# Patient Record
Sex: MALE | Race: WHITE | Employment: FULL TIME | URBAN - NONMETROPOLITAN AREA
[De-identification: names, ages, dates, MRNs, and addresses within clinical notes are randomized per-mention and may not be internally consistent; named-entity substitution may affect disease eponyms.]

---

## 2022-03-28 ENCOUNTER — HOSPITAL ENCOUNTER (OUTPATIENT)
Dept: LAB | Age: 22
Discharge: HOME | End: 2022-03-28
Payer: COMMERCIAL

## 2022-03-28 ENCOUNTER — OFFICE VISIT (OUTPATIENT)
Dept: OTOLARYNGOLOGY | Age: 22
End: 2022-03-28
Payer: COMMERCIAL

## 2022-03-28 VITALS
RESPIRATION RATE: 16 BRPM | BODY MASS INDEX: 36.59 KG/M2 | DIASTOLIC BLOOD PRESSURE: 76 MMHG | HEIGHT: 70 IN | HEART RATE: 82 BPM | OXYGEN SATURATION: 97 % | WEIGHT: 255.6 LBS | SYSTOLIC BLOOD PRESSURE: 118 MMHG

## 2022-03-28 DIAGNOSIS — R09.81 NASAL CONGESTION: ICD-10-CM

## 2022-03-28 DIAGNOSIS — J30.89 NON-SEASONAL ALLERGIC RHINITIS, UNSPECIFIED TRIGGER: ICD-10-CM

## 2022-03-28 DIAGNOSIS — H91.93 BILATERAL HEARING LOSS, UNSPECIFIED HEARING LOSS TYPE: ICD-10-CM

## 2022-03-28 DIAGNOSIS — J32.9 RECURRENT SINUSITIS: ICD-10-CM

## 2022-03-28 DIAGNOSIS — S02.2XXS CLOSED FRACTURE OF NASAL BONE, SEQUELA: Primary | ICD-10-CM

## 2022-03-28 PROCEDURE — 36415 COLL VENOUS BLD VENIPUNCTURE: CPT

## 2022-03-28 PROCEDURE — 82785 ASSAY OF IGE: CPT

## 2022-03-28 PROCEDURE — 99214 OFFICE O/P EST MOD 30 MIN: CPT | Performed by: OTOLARYNGOLOGY

## 2022-03-28 PROCEDURE — 31231 NASAL ENDOSCOPY DX: CPT | Performed by: OTOLARYNGOLOGY

## 2022-03-28 PROCEDURE — 99204 OFFICE O/P NEW MOD 45 MIN: CPT | Performed by: OTOLARYNGOLOGY

## 2022-03-28 PROCEDURE — 86003 ALLG SPEC IGE CRUDE XTRC EA: CPT

## 2022-03-28 RX ORDER — MOMETASONE FUROATE 50 UG/1
2 SPRAY, METERED NASAL DAILY
Qty: 1 EACH | Refills: 3 | Status: SHIPPED | OUTPATIENT
Start: 2022-03-28

## 2022-03-28 RX ORDER — AZELASTINE 1 MG/ML
2 SPRAY, METERED NASAL 2 TIMES DAILY
Qty: 30 ML | Refills: 1 | Status: SHIPPED | OUTPATIENT
Start: 2022-03-28 | End: 2022-08-22

## 2022-03-28 RX ORDER — LORATADINE 10 MG/1
10 TABLET ORAL DAILY
COMMUNITY

## 2022-03-28 RX ORDER — LEVOTHYROXINE SODIUM 0.1 MG/1
163 TABLET ORAL DAILY
COMMUNITY

## 2022-03-28 NOTE — PROGRESS NOTES
3/28/2022 8:19 AM EDT  Srinath Olson (:  2000) is a 24 y.o. male,New patient, here for evaluation of the following chief complaint(s):  Sinus Problem (trouble breathing through noses, Hx of broken nose) and Hearing Problem (hearing loss x 1.5 years)      ASSESSMENT/PLAN:  1. Closed fracture of nasal bone, sequela    2. Recurrent sinusitis    3. Non-seasonal allergic rhinitis, unspecified trigger    4. Bilateral hearing loss, unspecified hearing loss type    5. Nasal congestion      1. Closed fracture of nasal bone, sequela  -     CT SINUS WO CONTRAST; Future  2. Recurrent sinusitis  -     CT SINUS WO CONTRAST; Future  3. Non-seasonal allergic rhinitis, unspecified trigger  -     Allergen, Region 5 Respiratory Panel; Future  4. Bilateral hearing loss, unspecified hearing loss type  -     External Referral To Audiology  5. Nasal congestion    CT sinus to evaluate sinuses, nasal bones  Allergy test  Switch Flonase to Nasonex  Add Astelin  Audio  Follow-up to review    No follow-ups on file. SUBJECTIVE/OBJECTIVE:  HPI  17yo man with nasal obstruction for about 6 years. History of nasal bone fracture 2 years ago. Has been on Flonase and Claritin for about 10 years. Has diagnosis of allergic rhinitis. Also has hearing loss that he attributes to his sinus issues x 1.5 years. Works as  and it seems to be affecting his work. He states nasal obstruction is worse of the left side of his nose. He states that he is had multiple nasal traumas through sports and probably broken it more than once. He states that the nasal congestion is constant throughout the day and night but seems to be worse at nighttime. He is worked as an  for about 6 years and has a significant noise exposure. He gets about 1 sinus infection a year. No history of imaging or allergy testing.   No recent audiogram.    Past Medical History:   Diagnosis Date    Fracture of nasal bone     Headache     Hearing loss     Tinnitus      History reviewed. No pertinent surgical history. Social History    None       Family History   Problem Relation Age of Onset    No Known Problems Mother     Hypertension Father     No Known Problems Brother     No Known Problems Maternal Aunt     Hypertension Maternal Uncle     Thyroid Disease Paternal Aunt     Cancer Paternal Uncle     No Known Problems Maternal Grandmother     Cancer Maternal Grandfather     Thyroid Disease Paternal Grandmother     Cancer Paternal Grandfather     Diabetes Paternal Grandfather      Current Outpatient Medications   Medication Instructions    azelastine (ASTELIN) 0.1 % nasal spray 2 sprays, Nasal, 2 TIMES DAILY, Use in each nostril as directed    levothyroxine (SYNTHROID) 163 mcg, Oral, DAILY    loratadine (CLARITIN) 10 mg, Oral, DAILY    mometasone (NASONEX) 50 MCG/ACT nasal spray 2 sprays, Nasal, DAILY     No Known Allergies    ENT ROS: positive for - nasal congestion    General: The patient is found to be alert and normally responsive male. Hearing: grossly normal   Voice: Clear   Skin: The skin has normal colour and turgor. Face: The facial contour is symmetric at rest and with movement. Ears: The pinnae have normal contours. AD: EAC clear, TM intact, no effusion/erythema/retraction   AS: EAC clear, TM intact, no effusion/erythema/retraction   Eye: The ocular movements are full and symmetric, the conjunctiva is unremarkable; sclera are anicteric, pupillary response is symmetric. No nystagmus is found. Nose:   The external nasal contour is normal  The nares are patent without evidence of polyposis   Oral cavity:   The dentition is healthy. The oral mucosa is without lesions;  the tongue is symmetric with full mobility and is without fasciculation. The soft palate is symmetric. The oropharynx is unremarkable.   Neck: The neck has a normal contour; no masses are found on palpation    Fiberoptic examination of the upper airway using scope was conducted after first applying topical phenylephrine (1%) and lidocaine (4%) to the bilateral nasal cavity by spray. The endoscope was inserted and advanced through the bilateral side of the nose examining the mucosa and nasal structures. Right:  Inferior turbinate significantly enlarged, middle meatus clear, no polyps or purulence, excess thickened mucus  Left:  Inferior turbinate mildly enlarged and inflamed, middle meatus clear, no polyps or purulence  Nasopharynx clear, ET patent, adenoid small. Septum slightly to the right anteriorly. An electronic signature was used to authenticate this note.     --Sherly Mclean MD     3/28/2022 8:19 AM EDT

## 2022-03-30 LAB
2000687N OAK TREE IGE: <0.1 KU/L (ref 0–0.34)
ALLERGEN BERMUDA GRASS IGE: <0.1 KU/L (ref 0–0.34)
ALLERGEN BIRCH IGE: <0.1 KU/L (ref 0–0.34)
ALLERGEN DOG DANDER IGE: 0.11 KU/L (ref 0–0.34)
ALLERGEN GERMAN COCKROACH IGE: <0.1 KU/L (ref 0–0.34)
ALLERGEN HORMODENDRUM IGE: <0.1 KUL/L (ref 0–0.34)
ALLERGEN MOUSE EPITHELIA IGE: <0.1 KU/L (ref 0–0.34)
ALLERGEN PECAN TREE IGE: <0.1 KU/L (ref 0–0.34)
ALLERGEN PIGWEED ROUGH IGE: <0.1 KU/L (ref 0–0.34)
ALLERGEN SHEEP SORREL (W18) IGE: <0.1 KU/L (ref 0–0.34)
ALLERGEN TREE SYCAMORE: <0.1 KU/L (ref 0–0.34)
ALLERGEN WALNUT TREE IGE: <0.1 KU/L (ref 0–0.34)
ALLERGEN WHITE MULBERRY TREE, IGE: <0.1 KU/L (ref 0–0.34)
ALLERGEN, TREE, WHITE ASH IGE: <0.1 KU/L (ref 0–0.34)
ALTERNARIA ALTERNATA: <0.1 KU/L (ref 0–0.34)
ASPERGILLUS FUMIGATUS: <0.1 KU/L (ref 0–0.34)
CAT DANDER ANTIBODY: 0.62 KU/L (ref 0–0.34)
COTTONWOOD TREE: <0.1 KU/L (ref 0–0.34)
D. FARINAE: 10 KU/L (ref 0–0.34)
D. PTERONYSSINUS: 8.54 KU/L (ref 0–0.34)
ELM TREE: <0.1 KU/L (ref 0–0.34)
IGE: 33 IU/ML
MAPLE/BOXELDER TREE: <0.1 KU/L (ref 0–0.34)
MOUNTAIN CEDAR TREE: <0.1 KU/L (ref 0–0.34)
MUCOR RACEMOSUS: <0.1 KU/L (ref 0–0.34)
P. NOTATUM: <0.1 KU/L (ref 0–0.34)
RUSSIAN THISTLE: <0.1 KU/L (ref 0–0.34)
SHORT RAGWD(A ARTEMIS.) IGE: <0.1 KU/L (ref 0–0.34)
TIMOTHY GRASS: <0.1 KU/L (ref 0–0.34)

## 2022-04-04 ENCOUNTER — HOSPITAL ENCOUNTER (OUTPATIENT)
Dept: CT IMAGING | Age: 22
Discharge: HOME | End: 2022-04-06
Payer: COMMERCIAL

## 2022-04-04 DIAGNOSIS — S02.2XXS CLOSED FRACTURE OF NASAL BONE, SEQUELA: ICD-10-CM

## 2022-04-04 DIAGNOSIS — J32.9 RECURRENT SINUSITIS: ICD-10-CM

## 2022-04-04 PROCEDURE — 70486 CT MAXILLOFACIAL W/O DYE: CPT

## 2022-05-20 ENCOUNTER — OFFICE VISIT (OUTPATIENT)
Dept: OTOLARYNGOLOGY | Age: 22
End: 2022-05-20
Payer: COMMERCIAL

## 2022-05-20 VITALS
BODY MASS INDEX: 36.51 KG/M2 | SYSTOLIC BLOOD PRESSURE: 122 MMHG | DIASTOLIC BLOOD PRESSURE: 72 MMHG | HEIGHT: 70 IN | RESPIRATION RATE: 14 BRPM | OXYGEN SATURATION: 98 % | HEART RATE: 82 BPM | WEIGHT: 255 LBS

## 2022-05-20 DIAGNOSIS — J30.89 ALLERGIC RHINITIS DUE TO DUST MITE: ICD-10-CM

## 2022-05-20 DIAGNOSIS — R09.81 NASAL CONGESTION: ICD-10-CM

## 2022-05-20 DIAGNOSIS — J30.81 ALLERGIC RHINITIS DUE TO ANIMALS: Primary | ICD-10-CM

## 2022-05-20 PROCEDURE — 99213 OFFICE O/P EST LOW 20 MIN: CPT | Performed by: OTOLARYNGOLOGY

## 2022-05-20 RX ORDER — MONTELUKAST SODIUM 10 MG/1
10 TABLET ORAL EVERY EVENING
Qty: 30 TABLET | Refills: 3 | Status: SHIPPED | OUTPATIENT
Start: 2022-05-20 | End: 2022-10-11

## 2022-05-23 NOTE — PROGRESS NOTES
2022 8:19 AM EDT  Yesenia Vela (:  2000) is a 24 y.o. male,New patient, here for evaluation of the following chief complaint(s): Other (ck audio , allergy testing  ct)      ASSESSMENT/PLAN:  1. Allergic rhinitis due to animals    2. Allergic rhinitis due to dust mite    3. Nasal congestion      1. Allergic rhinitis due to animals  2. Allergic rhinitis due to dust mite  3. Nasal congestion    Will focus on allergies:  DC Nasonex  Continue Astelin  Singulair every evening  Follow-up in about 1 month  Pain with loud noise exposure is likely physiologic. Discussed limitations of the stapedius reflex. Hearing is normal.  Tinnitus likely related to occupational noise exposure as an . With ongoing exposures, tinnitus may progress. No follow-ups on file. SUBJECTIVE/OBJECTIVE:  HPI  19yo man with nasal obstruction for about 6 years. History of nasal bone fracture 2 years ago. Has been on Flonase and Claritin for about 10 years. Has diagnosis of allergic rhinitis. Also has hearing loss that he attributes to his sinus issues x 1.5 years. Works as  and it seems to be affecting his work. He states nasal obstruction is worse of the left side of his nose. He states that he is had multiple nasal traumas through sports and probably broken it more than once. He states that the nasal congestion is constant throughout the day and night but seems to be worse at nighttime. He is worked as an  for about 6 years and has a significant noise exposure. He gets about 1 sinus infection a year. No history of imaging or allergy testing. No recent audiogram.  He also reports constant bilateral tinnitus. He has otalgia particularly in the left ear with high noise levels greater than 100 DB. Today he follows up about 2 months later. he states that his mom has 4 cats. He notices worsening nasal congestion and sinus issues with norma environments.     RAST 3/28/22: +cat, dust mites     CT sinus 4/4/22: No evidence of sinusitis    Audiogram 4/12/22:    hearing within normal limits EU  Word discrimination score 100% AU  Tympanometry type A AU    Past Medical History:   Diagnosis Date    Fracture of nasal bone     Headache     Hearing loss     Tinnitus      No past surgical history on file. Social History    None       Family History   Problem Relation Age of Onset    No Known Problems Mother     Hypertension Father     No Known Problems Brother     No Known Problems Maternal Aunt     Hypertension Maternal Uncle     Thyroid Disease Paternal Aunt     Cancer Paternal Uncle     No Known Problems Maternal Grandmother     Cancer Maternal Grandfather     Thyroid Disease Paternal Grandmother     Cancer Paternal Grandfather     Diabetes Paternal Grandfather      Current Outpatient Medications   Medication Instructions    azelastine (ASTELIN) 0.1 % nasal spray 2 sprays, Nasal, 2 TIMES DAILY, Use in each nostril as directed    levothyroxine (SYNTHROID) 163 mcg, Oral, DAILY    loratadine (CLARITIN) 10 mg, Oral, DAILY    mometasone (NASONEX) 50 MCG/ACT nasal spray 2 sprays, Nasal, DAILY    montelukast (SINGULAIR) 10 mg, Oral, EVERY EVENING     No Known Allergies    ENT ROS: positive for - nasal congestion    General: The patient is found to be alert and normally responsive male. Hearing: grossly normal   Voice: Clear   Skin: The skin has normal colour and turgor. Face: The facial contour is symmetric at rest and with movement. Ears: The pinnae have normal contours. AD: EAC clear, TM intact, no effusion/erythema/retraction   AS: EAC clear, TM intact, no effusion/erythema/retraction   Eye: The ocular movements are full and symmetric, the conjunctiva is unremarkable; sclera are anicteric, pupillary response is symmetric. No nystagmus is found. Nose:   The external nasal contour is normal  The nares are patent without evidence of polyposis   Oral cavity:    The dentition is healthy. The oral mucosa is without lesions;  the tongue is symmetric with full mobility and is without fasciculation. The soft palate is symmetric. The oropharynx is unremarkable. Neck: The neck has a normal contour; no masses are found on palpation  Previous:    Fiberoptic examination of the upper airway using scope was conducted after first applying topical phenylephrine (1%) and lidocaine (4%) to the bilateral nasal cavity by spray. The endoscope was inserted and advanced through the bilateral side of the nose examining the mucosa and nasal structures. Right:  Inferior turbinate significantly enlarged, middle meatus clear, no polyps or purulence, excess thickened mucus  Left:  Inferior turbinate mildly enlarged and inflamed, middle meatus clear, no polyps or purulence  Nasopharynx clear, ET patent, adenoid small. Septum slightly to the right anteriorly. An electronic signature was used to authenticate this note.     --Charanjit Diaz MD     5/23/2022 8:19 AM EDT

## 2022-06-24 ENCOUNTER — OFFICE VISIT (OUTPATIENT)
Dept: OTOLARYNGOLOGY | Age: 22
End: 2022-06-24
Payer: COMMERCIAL

## 2022-06-24 VITALS
WEIGHT: 248.6 LBS | HEIGHT: 70 IN | DIASTOLIC BLOOD PRESSURE: 68 MMHG | RESPIRATION RATE: 16 BRPM | OXYGEN SATURATION: 97 % | SYSTOLIC BLOOD PRESSURE: 130 MMHG | HEART RATE: 79 BPM | BODY MASS INDEX: 35.59 KG/M2

## 2022-06-24 DIAGNOSIS — J30.81 ALLERGIC RHINITIS DUE TO ANIMALS: Primary | ICD-10-CM

## 2022-06-24 DIAGNOSIS — J30.89 ALLERGIC RHINITIS DUE TO DUST MITE: ICD-10-CM

## 2022-06-24 DIAGNOSIS — R09.81 NASAL CONGESTION: ICD-10-CM

## 2022-06-24 PROCEDURE — 99213 OFFICE O/P EST LOW 20 MIN: CPT | Performed by: OTOLARYNGOLOGY

## 2022-06-24 RX ORDER — METHYLPREDNISOLONE 4 MG/1
TABLET ORAL
Qty: 1 KIT | Refills: 0 | Status: SHIPPED | OUTPATIENT
Start: 2022-06-24 | End: 2022-06-30

## 2022-06-24 RX ORDER — FLUTICASONE FUROATE 27.5 UG/1
2 SPRAY, METERED NASAL DAILY
Qty: 1 EACH | Refills: 3 | Status: SHIPPED | OUTPATIENT
Start: 2022-06-24 | End: 2022-08-22

## 2022-06-24 ASSESSMENT — ENCOUNTER SYMPTOMS: SINUS COMPLAINT: 1

## 2022-06-24 NOTE — PROGRESS NOTES
2022 8:19 AM EDT  Sara Brasher (:  2000) is a 24 y.o. male,New patient, here for evaluation of the following chief complaint(s):  Sinus Problem (fu singular)      ASSESSMENT/PLAN:  1. Allergic rhinitis due to animals    2. Allergic rhinitis due to dust mite    3. Nasal congestion      1. Allergic rhinitis due to animals  2. Allergic rhinitis due to dust mite  3. Nasal congestion    Will focus on allergies:  Restart flonase sensimist   Continue Astelin  Singulair every evening  claritin every am   Medrol dose pack to start 1 hour after Synthroid  Follow-up in about 1 month  Discussed ITR and nasal valve repair with Latera   Pain with loud noise exposure is likely physiologic. Discussed limitations of the stapedius reflex. Hearing is normal.  Tinnitus likely related to occupational noise exposure as an . With ongoing exposures, tinnitus may progress. No follow-ups on file. SUBJECTIVE/OBJECTIVE:  Other    Sinus Problem      19yo man with nasal obstruction for about 6 years. History of nasal bone fracture 2 years ago. Has been on Flonase and Claritin for about 10 years. Has diagnosis of allergic rhinitis. Also has hearing loss that he attributes to his sinus issues x 1.5 years. Works as  and it seems to be affecting his work. He states nasal obstruction is worse of the left side of his nose. He states that he is had multiple nasal traumas through sports and probably broken it more than once. He states that the nasal congestion is constant throughout the day and night but seems to be worse at nighttime. He is worked as an  for about 6 years and has a significant noise exposure. He gets about 1 sinus infection a year. No history of imaging or allergy testing. No recent audiogram.  He also reports constant bilateral tinnitus. He has otalgia particularly in the left ear with high noise levels greater than 100 DB.      Followed up about 2 months later. he states that his mom has 4 cats. He notices worsening nasal congestion and sinus issues with norma environments. RAST 3/28/22: +cat, dust mites     CT sinus 4/4/22: No evidence of sinusitis    Audiogram 4/12/22:   hearing within normal limits EU  Word discrimination score 100% AU  Tympanometry type A AU    Here to review response. He is has stopped the Nasonex because of the numbness feeling it created in the nose. He has continued Astelin. He takes Claritin in the morning and Singulair in the evening. He has not noticed much improvement in his nasal congestion. Past Medical History:   Diagnosis Date    Fracture of nasal bone     Headache     Hearing loss     Tinnitus      History reviewed. No pertinent surgical history. Social History    None       Family History   Problem Relation Age of Onset    No Known Problems Mother     Hypertension Father     No Known Problems Brother     No Known Problems Maternal Aunt     Hypertension Maternal Uncle     Thyroid Disease Paternal Aunt     Cancer Paternal Uncle     No Known Problems Maternal Grandmother     Cancer Maternal Grandfather     Thyroid Disease Paternal Grandmother     Cancer Paternal Grandfather     Diabetes Paternal Grandfather      Current Outpatient Medications   Medication Instructions    azelastine (ASTELIN) 0.1 % nasal spray 2 sprays, Nasal, 2 TIMES DAILY, Use in each nostril as directed    fluticasone (FLONASE SENSIMIST) 27.5 MCG/SPRAY nasal spray 2 sprays, Each Nostril, DAILY    levothyroxine (SYNTHROID) 163 mcg, Oral, DAILY    loratadine (CLARITIN) 10 mg, Oral, DAILY    methylPREDNISolone (MEDROL DOSEPACK) 4 MG tablet Take by mouth.  mometasone (NASONEX) 50 MCG/ACT nasal spray 2 sprays, Nasal, DAILY    montelukast (SINGULAIR) 10 mg, Oral, EVERY EVENING     No Known Allergies    ENT ROS: positive for - nasal congestion    General: The patient is found to be alert and normally responsive male.     Hearing: grossly normal   Voice: Clear   Skin: The skin has normal colour and turgor. Face: The facial contour is symmetric at rest and with movement. Ears: The pinnae have normal contours. AD: EAC clear, TM intact, no effusion/erythema/retraction   AS: EAC clear, TM intact, no effusion/erythema/retraction   Eye: The ocular movements are full and symmetric, the conjunctiva is unremarkable; sclera are anicteric, pupillary response is symmetric. No nystagmus is found. Nose:   The external nasal contour is normal  Nasal mucosa inflamed  The nares are patent without evidence of polyposis   Inferior turbinate hypertrophy, more on the left than the right  Oral cavity:   The dentition is healthy. The oral mucosa is without lesions;  the tongue is symmetric with full mobility and is without fasciculation. The soft palate is symmetric. The oropharynx is unremarkable. Neck: The neck has a normal contour; no masses are found on palpation    Previous:  Fiberoptic examination of the upper airway using scope was conducted after first applying topical phenylephrine (1%) and lidocaine (4%) to the bilateral nasal cavity by spray. The endoscope was inserted and advanced through the bilateral side of the nose examining the mucosa and nasal structures. Right:  Inferior turbinate significantly enlarged, middle meatus clear, no polyps or purulence, excess thickened mucus  Left:  Inferior turbinate mildly enlarged and inflamed, middle meatus clear, no polyps or purulence  Nasopharynx clear, ET patent, adenoid small. Septum slightly to the right anteriorly. An electronic signature was used to authenticate this note.     --Valery Boyd MD     6/24/2022 8:19 AM EDT

## 2022-08-22 ENCOUNTER — OFFICE VISIT (OUTPATIENT)
Dept: OTOLARYNGOLOGY | Age: 22
End: 2022-08-22
Payer: COMMERCIAL

## 2022-08-22 VITALS
SYSTOLIC BLOOD PRESSURE: 134 MMHG | OXYGEN SATURATION: 96 % | BODY MASS INDEX: 36.25 KG/M2 | WEIGHT: 253.2 LBS | DIASTOLIC BLOOD PRESSURE: 78 MMHG | HEART RATE: 86 BPM | RESPIRATION RATE: 14 BRPM | HEIGHT: 70 IN

## 2022-08-22 DIAGNOSIS — J30.89 ALLERGIC RHINITIS DUE TO DUST MITE: ICD-10-CM

## 2022-08-22 DIAGNOSIS — J30.81 ALLERGIC RHINITIS DUE TO ANIMALS: Primary | ICD-10-CM

## 2022-08-22 DIAGNOSIS — J34.89 NASAL VALVE COLLAPSE: ICD-10-CM

## 2022-08-22 DIAGNOSIS — R09.81 NASAL CONGESTION: ICD-10-CM

## 2022-08-22 DIAGNOSIS — J34.3 HYPERTROPHY OF INFERIOR NASAL TURBINATE: ICD-10-CM

## 2022-08-22 PROCEDURE — 99213 OFFICE O/P EST LOW 20 MIN: CPT | Performed by: OTOLARYNGOLOGY

## 2022-08-22 RX ORDER — LEVOTHYROXINE SODIUM 175 UG/1
TABLET ORAL
COMMUNITY
Start: 2022-07-06

## 2022-08-22 RX ORDER — FLUTICASONE FUROATE 27.5 UG/1
2 SPRAY, METERED NASAL DAILY
Qty: 1 EACH | Refills: 5 | Status: SHIPPED | OUTPATIENT
Start: 2022-08-22

## 2022-08-22 RX ORDER — AZELASTINE 1 MG/ML
2 SPRAY, METERED NASAL 2 TIMES DAILY
Qty: 30 ML | Refills: 5 | Status: SHIPPED | OUTPATIENT
Start: 2022-08-22

## 2022-08-22 ASSESSMENT — ENCOUNTER SYMPTOMS: SINUS COMPLAINT: 1

## 2022-08-22 NOTE — PATIENT INSTRUCTIONS
Latera implant for nasal valve collapse  Inferior turbinate reduction for inferior turbinate hypertrophy

## 2022-08-22 NOTE — PROGRESS NOTES
2022 8:19 AM EDT  Ellie Alamo (:  2000) is a 24 y.o. male,New patient, here for evaluation of the following chief complaint(s):  Sinus Problem (6 wk fu medrol and flonase/)      ASSESSMENT/PLAN:  1. Allergic rhinitis due to animals    2. Allergic rhinitis due to dust mite    3. Nasal congestion    4. Hypertrophy of inferior nasal turbinate    5. Nasal valve collapse      1. Allergic rhinitis due to animals  2. Allergic rhinitis due to dust mite  3. Nasal congestion  4. Hypertrophy of inferior nasal turbinate  5. Nasal valve collapse    Will focus on allergies:  Continue flonase sensimist   Continue Astelin  Continue Singulair every evening  Continue claritin every am  Start NeilMed sinus rinse once a day    Discussed ITR and nasal valve repair with Latera due to maximal medical management and refractory symptoms  Follow-up in 1 month to rediscuss surgery options    No follow-ups on file. SUBJECTIVE/OBJECTIVE:  Sinus Problem    Other    22yo man with nasal obstruction for about 6 years. History of nasal bone fracture 2 years ago. Has been on Flonase and Claritin for about 10 years. Has diagnosis of allergic rhinitis. Also has hearing loss that he attributes to his sinus issues x 1.5 years. Works as  and it seems to be affecting his work. He states nasal obstruction is worse of the left side of his nose. He states that he is had multiple nasal traumas through sports and probably broken it more than once. He states that the nasal congestion is constant throughout the day and night but seems to be worse at nighttime. He is worked as an  for about 6 years and has a significant noise exposure. He gets about 1 sinus infection a year. No history of imaging or allergy testing. No recent audiogram.  He also reports constant bilateral tinnitus. He has otalgia particularly in the left ear with high noise levels greater than 100 DB.      Followed up about 2 months later. he states that his mom has 4 cats. He notices worsening nasal congestion and sinus issues with norma environments. RAST 3/28/22: +cat, dust mites     CT sinus 4/4/22: No evidence of sinusitis    Audiogram 4/12/22:   hearing within normal limits EU  Word discrimination score 100% AU  Tympanometry type A AU    Followed up to review response he is has stopped the Nasonex because of the numbness feeling it created in the nose. He has continued Astelin. He takes Claritin in the morning and Singulair in the evening. He has not noticed much improvement in his nasal congestion. Today he again follows up about 6 weeks later to review his nasal congestion. He notices significant improvement after the Medrol Dosepak. He felt better for about 2 weeks but the symptoms have returned to his baseline. He continues to endorse nasal congestion throughout the day and at nighttime. He has been using routinely Flonase, Astelin, Singulair, Claritin. Past Medical History:   Diagnosis Date    Fracture of nasal bone     Headache     Hearing loss     Tinnitus      History reviewed. No pertinent surgical history.   Social History    None       Family History   Problem Relation Age of Onset    No Known Problems Mother     Hypertension Father     No Known Problems Brother     No Known Problems Maternal Aunt     Hypertension Maternal Uncle     Thyroid Disease Paternal Aunt     Cancer Paternal Uncle     No Known Problems Maternal Grandmother     Cancer Maternal Grandfather     Thyroid Disease Paternal Grandmother     Cancer Paternal Grandfather     Diabetes Paternal Grandfather      Current Outpatient Medications   Medication Instructions    azelastine (ASTELIN) 0.1 % nasal spray 2 sprays, Nasal, 2 TIMES DAILY, Use in each nostril as directed    fluticasone (FLONASE SENSIMIST) 27.5 MCG/SPRAY nasal spray 2 sprays, Each Nostril, DAILY    levothyroxine (SYNTHROID) 175 MCG tablet take 1 tablet by mouth once daily levothyroxine (SYNTHROID) 163 mcg, DAILY    loratadine (CLARITIN) 10 mg, Oral, DAILY    mometasone (NASONEX) 50 MCG/ACT nasal spray 2 sprays, Nasal, DAILY    montelukast (SINGULAIR) 10 mg, Oral, EVERY EVENING     No Known Allergies    ENT ROS: positive for - nasal congestion    General: The patient is found to be alert and normally responsive male. Hearing: grossly normal   Voice: Clear   Skin: The skin has normal colour and turgor. Face: The facial contour is symmetric at rest and with movement. Ears: The pinnae have normal contours. AD: EAC clear, TM intact, no effusion/erythema/retraction   AS: EAC clear, TM intact, no effusion/erythema/retraction   Eye: The ocular movements are full and symmetric, the conjunctiva is unremarkable; sclera are anicteric, pupillary response is symmetric. No nystagmus is found. Nose:   The external nasal contour is normal  Nasal mucosa inflamed  The nares are patent without evidence of polyposis   Inferior turbinate hypertrophy, more on the right than the left  + Modified Sana maneuver bilaterally  Oral cavity:   The dentition is healthy. The oral mucosa is without lesions;  the tongue is symmetric with full mobility and is without fasciculation. The soft palate is symmetric. The oropharynx is unremarkable. Neck: The neck has a normal contour; no masses are found on palpation    Previous:  Fiberoptic examination of the upper airway using scope was conducted after first applying topical phenylephrine (1%) and lidocaine (4%) to the bilateral nasal cavity by spray. The endoscope was inserted and advanced through the bilateral side of the nose examining the mucosa and nasal structures. Right:  Inferior turbinate significantly enlarged, middle meatus clear, no polyps or purulence, excess thickened mucus  Left:  Inferior turbinate mildly enlarged and inflamed, middle meatus clear, no polyps or purulence  Nasopharynx clear, ET patent, adenoid small.  Septum slightly to the right anteriorly. An electronic signature was used to authenticate this note.     --George Wall MD     8/22/2022 8:19 AM EDT

## 2022-09-16 ENCOUNTER — OFFICE VISIT (OUTPATIENT)
Dept: OTOLARYNGOLOGY | Age: 22
End: 2022-09-16
Payer: COMMERCIAL

## 2022-09-16 VITALS
HEIGHT: 70 IN | HEART RATE: 77 BPM | DIASTOLIC BLOOD PRESSURE: 82 MMHG | OXYGEN SATURATION: 98 % | RESPIRATION RATE: 14 BRPM | SYSTOLIC BLOOD PRESSURE: 138 MMHG | WEIGHT: 251.4 LBS | TEMPERATURE: 96.6 F | BODY MASS INDEX: 35.99 KG/M2

## 2022-09-16 DIAGNOSIS — Z01.818 PRE-OP TESTING: ICD-10-CM

## 2022-09-16 DIAGNOSIS — R09.81 NASAL CONGESTION: Primary | ICD-10-CM

## 2022-09-16 DIAGNOSIS — J34.89 NASAL VALVE COLLAPSE: ICD-10-CM

## 2022-09-16 DIAGNOSIS — J34.3 HYPERTROPHY OF INFERIOR NASAL TURBINATE: ICD-10-CM

## 2022-09-16 PROCEDURE — 99213 OFFICE O/P EST LOW 20 MIN: CPT | Performed by: OTOLARYNGOLOGY

## 2022-09-16 ASSESSMENT — ENCOUNTER SYMPTOMS: SINUS COMPLAINT: 1

## 2022-09-16 NOTE — PROGRESS NOTES
2022 8:19 AM EDT  Natalie Buckner (:  2000) is a 24 y.o. male,New patient, here for evaluation of the following chief complaint(s):  Sinus Problem (1 mo fu astelin, cordell med, flonase)      ASSESSMENT/PLAN:  1. Nasal congestion    2. Hypertrophy of inferior nasal turbinate    3. Nasal valve collapse    4. Pre-op testing      1. Nasal congestion  2. Hypertrophy of inferior nasal turbinate  3. Nasal valve collapse  4. Pre-op testing  -     CBC with Auto Differential; Future  -     Basic Metabolic Panel; Future    Will focus on allergies:  Continue flonase sensimist   Continue Astelin  Continue Singulair every evening  Continue claritin every am  Start NeilMed sinus rinse once a day    Discussed ITR and nasal valve repair with Latera due to maximal medical management and refractory symptoms    Add betamethasone     Discussed that the goal of surgery is to make chronic sinus and nasal disease less symptomatic and make chronic issues more manageable with medications. Discussed risks, benefits, indications, and alternatives to latera implant/turbinate reduction including but not limited to epistaxis/bleeding, pain, infection, crusting, nasal drainage, thick mucus, anosmia, prolonged healing, damage to surrounding structures need for revision procedures, extrusion, deformity, synechiae, and need for continued medical management to maintain results as well as continued nasal obstruction. No follow-ups on file. SUBJECTIVE/OBJECTIVE:  Sinus Problem    Other    22yo man with nasal obstruction for about 6 years. History of nasal bone fracture 2 years ago. Has been on Flonase and Claritin for about 10 years. Has diagnosis of allergic rhinitis. Also has hearing loss that he attributes to his sinus issues x 1.5 years. Works as  and it seems to be affecting his work. He states nasal obstruction is worse of the left side of his nose.   He states that he is had multiple nasal traumas through sports and probably broken it more than once. He states that the nasal congestion is constant throughout the day and night but seems to be worse at nighttime. He is worked as an  for about 6 years and has a significant noise exposure. He gets about 1 sinus infection a year. No history of imaging or allergy testing. No recent audiogram.  He also reports constant bilateral tinnitus. He has otalgia particularly in the left ear with high noise levels greater than 100 DB. Followed up about 2 months later. he states that his mom has 4 cats. He notices worsening nasal congestion and sinus issues with norma environments. RAST 3/28/22: +cat, dust mites     CT sinus 4/4/22: No evidence of sinusitis    Audiogram 4/12/22:   hearing within normal limits EU  Word discrimination score 100% AU  Tympanometry type A AU    Followed up to review response he is has stopped the Nasonex because of the numbness feeling it created in the nose. He has continued Astelin. He takes Claritin in the morning and Singulair in the evening. He has not noticed much improvement in his nasal congestion. He followed up about 6 weeks later to review his nasal congestion. He notices significant improvement after the Medrol Dosepak. He felt better for about 2 weeks but the symptoms have returned to his baseline. He continues to endorse nasal congestion throughout the day and at nighttime. He has been using routinely Flonase, Astelin, Singulair, Claritin. He follows up again about 4 weeks later. Noticing some improvement but minimal.  States that for the first 2 weeks he did notice some improvement but now it has decreased and returned to his baseline. 2 weeks ago he did suffer an upper respiratory infection. Past Medical History:   Diagnosis Date    Fracture of nasal bone     Headache     Hearing loss     Tinnitus      History reviewed. No pertinent surgical history.   Social History    None       Family History Problem Relation Age of Onset    No Known Problems Mother     Hypertension Father     No Known Problems Brother     No Known Problems Maternal Aunt     Hypertension Maternal Uncle     Thyroid Disease Paternal Aunt     Cancer Paternal Uncle     No Known Problems Maternal Grandmother     Cancer Maternal Grandfather     Thyroid Disease Paternal Grandmother     Cancer Paternal Grandfather     Diabetes Paternal Grandfather      Current Outpatient Medications   Medication Instructions    azelastine (ASTELIN) 0.1 % nasal spray 2 sprays, Nasal, 2 TIMES DAILY, Use in each nostril as directed    fluticasone (FLONASE SENSIMIST) 27.5 MCG/SPRAY nasal spray 2 sprays, Each Nostril, DAILY    levothyroxine (SYNTHROID) 175 MCG tablet take 1 tablet by mouth once daily    levothyroxine (SYNTHROID) 163 mcg, DAILY    loratadine (CLARITIN) 10 mg, Oral, DAILY    mometasone (NASONEX) 50 MCG/ACT nasal spray 2 sprays, Nasal, DAILY    montelukast (SINGULAIR) 10 mg, Oral, EVERY EVENING     No Known Allergies    ENT ROS: positive for - nasal congestion    General: The patient is found to be alert and normally responsive male. Hearing: grossly normal   Voice: Clear   Skin: The skin has normal colour and turgor. Face: The facial contour is symmetric at rest and with movement. Ears: The pinnae have normal contours. AD: EAC clear, TM intact, no effusion/erythema/retraction   AS: EAC clear, TM intact, no effusion/erythema/retraction   Eye: The ocular movements are full and symmetric, the conjunctiva is unremarkable; sclera are anicteric, pupillary response is symmetric. No nystagmus is found. Nose:   The external nasal contour is normal  Nasal mucosa inflamed  The nares are patent without evidence of polyposis   Inferior turbinate hypertrophy, more on the left than the right  + Modified Sana maneuver bilaterally  Oral cavity:   The dentition is healthy.   The oral mucosa is without lesions;  the tongue is symmetric with full mobility and is without fasciculation. The soft palate is symmetric. The oropharynx is unremarkable. Neck: The neck has a normal contour; no masses are found on palpation    Previous:  Fiberoptic examination of the upper airway using scope was conducted after first applying topical phenylephrine (1%) and lidocaine (4%) to the bilateral nasal cavity by spray. The endoscope was inserted and advanced through the bilateral side of the nose examining the mucosa and nasal structures. Right:  Inferior turbinate significantly enlarged, middle meatus clear, no polyps or purulence, excess thickened mucus  Left:  Inferior turbinate mildly enlarged and inflamed, middle meatus clear, no polyps or purulence  Nasopharynx clear, ET patent, adenoid small. Septum slightly to the right anteriorly. An electronic signature was used to authenticate this note.     --Vinay Morton MD     9/16/2022 8:19 AM EDT

## 2022-10-11 RX ORDER — MONTELUKAST SODIUM 10 MG/1
10 TABLET ORAL EVERY EVENING
Qty: 30 TABLET | Refills: 3 | Status: SHIPPED | OUTPATIENT
Start: 2022-10-11

## 2022-11-02 ENCOUNTER — TELEPHONE (OUTPATIENT)
Dept: OTOLARYNGOLOGY | Age: 22
End: 2022-11-02

## 2022-11-02 ENCOUNTER — HOSPITAL ENCOUNTER (OUTPATIENT)
Dept: LAB | Age: 22
Discharge: HOME | End: 2022-11-02
Payer: COMMERCIAL

## 2022-11-02 DIAGNOSIS — Z01.818 PRE-OP TESTING: ICD-10-CM

## 2022-11-02 LAB
ABSOLUTE EOS #: 0.16 K/UL (ref 0–0.44)
ABSOLUTE IMMATURE GRANULOCYTE: <0.03 K/UL (ref 0–0.3)
ABSOLUTE LYMPH #: 3.23 K/UL (ref 1.1–3.7)
ABSOLUTE MONO #: 0.67 K/UL (ref 0.1–1.2)
ANION GAP SERPL CALCULATED.3IONS-SCNC: 9 MMOL/L (ref 9–17)
BASOPHILS # BLD: 1 % (ref 0–2)
BASOPHILS ABSOLUTE: 0.04 K/UL (ref 0–0.2)
BUN BLDV-MCNC: 11 MG/DL (ref 6–20)
BUN/CREAT BLD: 15 (ref 9–20)
CALCIUM SERPL-MCNC: 9.7 MG/DL (ref 8.6–10.4)
CHLORIDE BLD-SCNC: 103 MMOL/L (ref 98–107)
CO2: 28 MMOL/L (ref 20–31)
CREAT SERPL-MCNC: 0.75 MG/DL (ref 0.7–1.2)
EOSINOPHILS RELATIVE PERCENT: 2 % (ref 1–4)
GFR SERPL CREATININE-BSD FRML MDRD: >60 ML/MIN/1.73M2
GLUCOSE BLD-MCNC: 91 MG/DL (ref 70–99)
HCT VFR BLD CALC: 43.9 % (ref 40.7–50.3)
HEMOGLOBIN: 14.8 G/DL (ref 13–17)
IMMATURE GRANULOCYTES: 0 %
LYMPHOCYTES # BLD: 48 % (ref 24–43)
MCH RBC QN AUTO: 28.8 PG (ref 25.2–33.5)
MCHC RBC AUTO-ENTMCNC: 33.7 G/DL (ref 25.2–33.5)
MCV RBC AUTO: 85.4 FL (ref 82.6–102.9)
MONOCYTES # BLD: 10 % (ref 3–12)
NRBC AUTOMATED: 0 PER 100 WBC
PDW BLD-RTO: 12.9 % (ref 11.8–14.4)
PLATELET # BLD: 332 K/UL (ref 138–453)
PMV BLD AUTO: 9.9 FL (ref 8.1–13.5)
POTASSIUM SERPL-SCNC: 4.4 MMOL/L (ref 3.7–5.3)
RBC # BLD: 5.14 M/UL (ref 4.21–5.77)
SEG NEUTROPHILS: 39 % (ref 36–65)
SEGMENTED NEUTROPHILS ABSOLUTE COUNT: 2.63 K/UL (ref 1.5–8.1)
SODIUM BLD-SCNC: 140 MMOL/L (ref 135–144)
WBC # BLD: 6.8 K/UL (ref 3.5–11.3)

## 2022-11-02 PROCEDURE — 85025 COMPLETE CBC W/AUTO DIFF WBC: CPT

## 2022-11-02 PROCEDURE — 36415 COLL VENOUS BLD VENIPUNCTURE: CPT

## 2022-11-02 PROCEDURE — 80048 BASIC METABOLIC PNL TOTAL CA: CPT

## 2022-11-02 NOTE — TELEPHONE ENCOUNTER
[facility]    Pre-Operative Evaluation/Consultation    Name:  Jarod Mckeon                                         Age:  20 y.o. MRN:  [id number]       :  2000   Date:  2022         Sex: male    There were no encounter diagnoses. Surgeon:  Dr. Mabel Bush   Procedure (Planned):  Bilateral turbinate reduction and nasal valve repair  Date Scheduled surgery:     Attending : No att. providers found    Primary Physician:None  Cardiologist: None    Type of Anesthesia Requested: General    Patient Medical history:  No Known Allergies  There is no problem list on file for this patient. Past Medical History:   Diagnosis Date    Fracture of nasal bone     Headache     Hearing loss     Tinnitus      No past surgical history on file. Social History     Tobacco Use    Smoking status: Never    Smokeless tobacco: Never   Vaping Use    Vaping Use: Never used   Substance Use Topics    Alcohol use: Never    Drug use: Never     Medications:  Current Outpatient Medications   Medication Sig Dispense Refill    montelukast (SINGULAIR) 10 MG tablet take 1 tablet by mouth every evening 30 tablet 3    levothyroxine (SYNTHROID) 175 MCG tablet take 1 tablet by mouth once daily      azelastine (ASTELIN) 0.1 % nasal spray 2 sprays by Nasal route 2 times daily Use in each nostril as directed 30 mL 5    fluticasone (FLONASE SENSIMIST) 27.5 MCG/SPRAY nasal spray 2 sprays by Each Nostril route daily 1 each 5    levothyroxine (SYNTHROID) 100 MCG tablet Take 163 mcg by mouth daily (Patient not taking: Reported on 2022)      loratadine (CLARITIN) 10 MG tablet Take 10 mg by mouth daily      mometasone (NASONEX) 50 MCG/ACT nasal spray 2 sprays by Nasal route daily (Patient not taking: No sig reported) 1 each 3     No current facility-administered medications for this visit. Scheduled Meds:  Continuous Infusions:  PRN Meds:.   Prior to Admission medications    Medication Sig Start Date End Date Taking? Authorizing Provider   montelukast (SINGULAIR) 10 MG tablet take 1 tablet by mouth every evening 10/11/22   Tera Tam MD   levothyroxine (SYNTHROID) 175 MCG tablet take 1 tablet by mouth once daily 7/6/22   Historical Provider, MD   azelastine (ASTELIN) 0.1 % nasal spray 2 sprays by Nasal route 2 times daily Use in each nostril as directed 8/22/22   Tera Tam MD   fluticasone (FLONASE SENSIMIST) 27.5 MCG/SPRAY nasal spray 2 sprays by Each Nostril route daily 8/22/22   Tera Tam MD   levothyroxine (SYNTHROID) 100 MCG tablet Take 163 mcg by mouth daily  Patient not taking: Reported on 8/22/2022    Historical Provider, MD   loratadine (CLARITIN) 10 MG tablet Take 10 mg by mouth daily    Historical Provider, MD   mometasone (NASONEX) 50 MCG/ACT nasal spray 2 sprays by Nasal route daily  Patient not taking: No sig reported 3/28/22   Tera Tam MD     Vital Signs (Current) [unfilled]    Weight:   Wt Readings from Last 1 Encounters:   09/16/22 251 lb 6.4 oz (114 kg)     Height:   Ht Readings from Last 1 Encounters:   09/16/22 5' 10\" (1.778 m)      BMI:  There is no height or weight on file to calculate BMI. Estimated body mass index is 36.07 kg/m² as calculated from the following:    Height as of 9/16/22: 5' 10\" (1.778 m). Weight as of 9/16/22: 251 lb 6.4 oz (114 kg). body mass index is unknown because there is no height or weight on file. Cardiac Clearance: None   Medical Clearance:None   Appointment for surgery Clearance scheduled for:None     Preoperative Testing: These are the current and completed labs:  CBC: No results found for: WBC, RBC, HGB, HCT, MCV, RDW, PLT  CMP: No results found for: NA, K, CL, CO2, BUN, CREATININE, GFRAA, AGRATIO, LABGLOM, GLUCOSE, GLU, PROT, CALCIUM, BILITOT, ALKPHOS, AST, ALT  POC Tests: No results for input(s): POCGLU, POCNA, POCK, POCCL, POCBUN, POCHEMO, POCHCT in the last 72 hours.   Coags  No results found for: PROTIME, INR, APTT  HCG (If Applicable) No results found for: PREGTESTUR, PREGSERUM, HCG, HCGQUANT   ABGs No results found for: PHART, PO2ART, JME8GET, CIJ2SXS, BEART, Q5BUSWZR   Type & Screen (If Applicable)  No results found for: LABABO, LABRH    Additional ordered pre-operative testing:  [x]CBC    []ABG      [x] BMP   []URINALYSIS   []CMP    []HCG   []COAGS PT/INR  []T&C  []LFTs   []TYPE AND SCREEN    [] EKG  [] Chest X-Ray  [] Other Radiology    [] Sent to Hospitalist None  [x] Sent to Anesthesia for your review: None   [] Additional Orders: None     Comments:None   Requests: None    Signed: Daniel Myrick LPN 37/9/6637 60:36 AM

## 2022-11-07 ENCOUNTER — ANESTHESIA EVENT (OUTPATIENT)
Dept: OPERATING ROOM | Age: 22
End: 2022-11-07
Payer: COMMERCIAL

## 2022-11-07 ENCOUNTER — HOSPITAL ENCOUNTER (OUTPATIENT)
Age: 22
Setting detail: OUTPATIENT SURGERY
Discharge: HOME | End: 2022-11-07
Attending: OTOLARYNGOLOGY | Admitting: OTOLARYNGOLOGY
Payer: COMMERCIAL

## 2022-11-07 ENCOUNTER — ANESTHESIA (OUTPATIENT)
Dept: OPERATING ROOM | Age: 22
End: 2022-11-07
Payer: COMMERCIAL

## 2022-11-07 VITALS
HEART RATE: 84 BPM | HEIGHT: 70 IN | DIASTOLIC BLOOD PRESSURE: 70 MMHG | WEIGHT: 246 LBS | BODY MASS INDEX: 35.22 KG/M2 | RESPIRATION RATE: 14 BRPM | TEMPERATURE: 97 F | SYSTOLIC BLOOD PRESSURE: 102 MMHG | OXYGEN SATURATION: 95 %

## 2022-11-07 DIAGNOSIS — G89.18 POST-OP PAIN: Primary | ICD-10-CM

## 2022-11-07 PROCEDURE — 6360000002 HC RX W HCPCS: Performed by: OTOLARYNGOLOGY

## 2022-11-07 PROCEDURE — C1889 IMPLANT/INSERT DEVICE, NOC: HCPCS | Performed by: OTOLARYNGOLOGY

## 2022-11-07 PROCEDURE — 7100000000 HC PACU RECOVERY - FIRST 15 MIN: Performed by: OTOLARYNGOLOGY

## 2022-11-07 PROCEDURE — 2580000003 HC RX 258: Performed by: NURSE ANESTHETIST, CERTIFIED REGISTERED

## 2022-11-07 PROCEDURE — 2709999900 HC NON-CHARGEABLE SUPPLY: Performed by: OTOLARYNGOLOGY

## 2022-11-07 PROCEDURE — 3600000003 HC SURGERY LEVEL 3 BASE: Performed by: OTOLARYNGOLOGY

## 2022-11-07 PROCEDURE — 7100000010 HC PHASE II RECOVERY - FIRST 15 MIN: Performed by: OTOLARYNGOLOGY

## 2022-11-07 PROCEDURE — 30140 RESECT INFERIOR TURBINATE: CPT | Performed by: OTOLARYNGOLOGY

## 2022-11-07 PROCEDURE — 6370000000 HC RX 637 (ALT 250 FOR IP): Performed by: OTOLARYNGOLOGY

## 2022-11-07 PROCEDURE — 2720000010 HC SURG SUPPLY STERILE: Performed by: OTOLARYNGOLOGY

## 2022-11-07 PROCEDURE — 2500000003 HC RX 250 WO HCPCS: Performed by: NURSE ANESTHETIST, CERTIFIED REGISTERED

## 2022-11-07 PROCEDURE — 3700000000 HC ANESTHESIA ATTENDED CARE: Performed by: OTOLARYNGOLOGY

## 2022-11-07 PROCEDURE — 3700000001 HC ADD 15 MINUTES (ANESTHESIA): Performed by: OTOLARYNGOLOGY

## 2022-11-07 PROCEDURE — 2500000003 HC RX 250 WO HCPCS: Performed by: OTOLARYNGOLOGY

## 2022-11-07 PROCEDURE — 2580000003 HC RX 258: Performed by: OTOLARYNGOLOGY

## 2022-11-07 PROCEDURE — 7100000011 HC PHASE II RECOVERY - ADDTL 15 MIN: Performed by: OTOLARYNGOLOGY

## 2022-11-07 PROCEDURE — 6360000002 HC RX W HCPCS: Performed by: NURSE ANESTHETIST, CERTIFIED REGISTERED

## 2022-11-07 PROCEDURE — 3600000013 HC SURGERY LEVEL 3 ADDTL 15MIN: Performed by: OTOLARYNGOLOGY

## 2022-11-07 PROCEDURE — 7100000001 HC PACU RECOVERY - ADDTL 15 MIN: Performed by: OTOLARYNGOLOGY

## 2022-11-07 PROCEDURE — 30468 RPR NSL VLV COLLAPSE W/IMPLT: CPT | Performed by: OTOLARYNGOLOGY

## 2022-11-07 DEVICE — IMPLANTABLE DEVICE: Type: IMPLANTABLE DEVICE | Site: NOSE | Status: FUNCTIONAL

## 2022-11-07 RX ORDER — DIPHENHYDRAMINE HYDROCHLORIDE 50 MG/ML
INJECTION INTRAMUSCULAR; INTRAVENOUS PRN
Status: DISCONTINUED | OUTPATIENT
Start: 2022-11-07 | End: 2022-11-07 | Stop reason: SDUPTHER

## 2022-11-07 RX ORDER — ROCURONIUM BROMIDE 10 MG/ML
INJECTION, SOLUTION INTRAVENOUS PRN
Status: DISCONTINUED | OUTPATIENT
Start: 2022-11-07 | End: 2022-11-07 | Stop reason: SDUPTHER

## 2022-11-07 RX ORDER — SODIUM CHLORIDE 0.9 % (FLUSH) 0.9 %
5-40 SYRINGE (ML) INJECTION EVERY 12 HOURS SCHEDULED
Status: DISCONTINUED | OUTPATIENT
Start: 2022-11-07 | End: 2022-11-07 | Stop reason: HOSPADM

## 2022-11-07 RX ORDER — LIDOCAINE HYDROCHLORIDE 20 MG/ML
INJECTION, SOLUTION EPIDURAL; INFILTRATION; INTRACAUDAL; PERINEURAL PRN
Status: DISCONTINUED | OUTPATIENT
Start: 2022-11-07 | End: 2022-11-07 | Stop reason: SDUPTHER

## 2022-11-07 RX ORDER — MIDAZOLAM HYDROCHLORIDE 1 MG/ML
INJECTION INTRAMUSCULAR; INTRAVENOUS PRN
Status: DISCONTINUED | OUTPATIENT
Start: 2022-11-07 | End: 2022-11-07 | Stop reason: SDUPTHER

## 2022-11-07 RX ORDER — SODIUM CHLORIDE 0.9 % (FLUSH) 0.9 %
5-40 SYRINGE (ML) INJECTION PRN
Status: DISCONTINUED | OUTPATIENT
Start: 2022-11-07 | End: 2022-11-07 | Stop reason: HOSPADM

## 2022-11-07 RX ORDER — ONDANSETRON 2 MG/ML
INJECTION INTRAMUSCULAR; INTRAVENOUS PRN
Status: DISCONTINUED | OUTPATIENT
Start: 2022-11-07 | End: 2022-11-07 | Stop reason: SDUPTHER

## 2022-11-07 RX ORDER — LIDOCAINE HYDROCHLORIDE AND EPINEPHRINE 10; 10 MG/ML; UG/ML
INJECTION, SOLUTION INFILTRATION; PERINEURAL PRN
Status: DISCONTINUED | OUTPATIENT
Start: 2022-11-07 | End: 2022-11-07 | Stop reason: ALTCHOICE

## 2022-11-07 RX ORDER — ONDANSETRON 4 MG/1
4 TABLET, ORALLY DISINTEGRATING ORAL 3 TIMES DAILY PRN
Qty: 10 TABLET | Refills: 0 | Status: SHIPPED | OUTPATIENT
Start: 2022-11-07

## 2022-11-07 RX ORDER — FENTANYL CITRATE 50 UG/ML
INJECTION, SOLUTION INTRAMUSCULAR; INTRAVENOUS PRN
Status: DISCONTINUED | OUTPATIENT
Start: 2022-11-07 | End: 2022-11-07 | Stop reason: SDUPTHER

## 2022-11-07 RX ORDER — SODIUM CHLORIDE, SODIUM LACTATE, POTASSIUM CHLORIDE, CALCIUM CHLORIDE 600; 310; 30; 20 MG/100ML; MG/100ML; MG/100ML; MG/100ML
INJECTION, SOLUTION INTRAVENOUS CONTINUOUS
Status: DISCONTINUED | OUTPATIENT
Start: 2022-11-07 | End: 2022-11-07 | Stop reason: SDUPTHER

## 2022-11-07 RX ORDER — OXYCODONE HYDROCHLORIDE 5 MG/1
5 TABLET ORAL EVERY 6 HOURS PRN
Qty: 12 TABLET | Refills: 0 | Status: SHIPPED | OUTPATIENT
Start: 2022-11-07 | End: 2022-11-10

## 2022-11-07 RX ORDER — CEPHALEXIN 500 MG/1
500 CAPSULE ORAL 2 TIMES DAILY
Qty: 14 CAPSULE | Refills: 0 | Status: SHIPPED | OUTPATIENT
Start: 2022-11-07 | End: 2022-11-14

## 2022-11-07 RX ORDER — DEXAMETHASONE SODIUM PHOSPHATE 10 MG/ML
INJECTION INTRAMUSCULAR; INTRAVENOUS PRN
Status: DISCONTINUED | OUTPATIENT
Start: 2022-11-07 | End: 2022-11-07 | Stop reason: SDUPTHER

## 2022-11-07 RX ORDER — SODIUM CHLORIDE, SODIUM LACTATE, POTASSIUM CHLORIDE, CALCIUM CHLORIDE 600; 310; 30; 20 MG/100ML; MG/100ML; MG/100ML; MG/100ML
INJECTION, SOLUTION INTRAVENOUS CONTINUOUS PRN
Status: DISCONTINUED | OUTPATIENT
Start: 2022-11-07 | End: 2022-11-07 | Stop reason: SDUPTHER

## 2022-11-07 RX ORDER — OXYMETAZOLINE HYDROCHLORIDE 0.05 G/100ML
SPRAY NASAL PRN
Status: DISCONTINUED | OUTPATIENT
Start: 2022-11-07 | End: 2022-11-07 | Stop reason: ALTCHOICE

## 2022-11-07 RX ORDER — SODIUM CHLORIDE, SODIUM LACTATE, POTASSIUM CHLORIDE, CALCIUM CHLORIDE 600; 310; 30; 20 MG/100ML; MG/100ML; MG/100ML; MG/100ML
INJECTION, SOLUTION INTRAVENOUS CONTINUOUS
Status: DISCONTINUED | OUTPATIENT
Start: 2022-11-07 | End: 2022-11-07 | Stop reason: HOSPADM

## 2022-11-07 RX ORDER — SODIUM CHLORIDE 9 MG/ML
INJECTION, SOLUTION INTRAVENOUS PRN
Status: DISCONTINUED | OUTPATIENT
Start: 2022-11-07 | End: 2022-11-07 | Stop reason: HOSPADM

## 2022-11-07 RX ORDER — PROPOFOL 10 MG/ML
INJECTION, EMULSION INTRAVENOUS PRN
Status: DISCONTINUED | OUTPATIENT
Start: 2022-11-07 | End: 2022-11-07 | Stop reason: SDUPTHER

## 2022-11-07 RX ADMIN — LIDOCAINE HYDROCHLORIDE 100 MG: 20 INJECTION, SOLUTION EPIDURAL; INFILTRATION; INTRACAUDAL; PERINEURAL at 10:31

## 2022-11-07 RX ADMIN — PROPOFOL 200 MG: 10 INJECTION, EMULSION INTRAVENOUS at 10:31

## 2022-11-07 RX ADMIN — FENTANYL CITRATE 50 MCG: 50 INJECTION, SOLUTION INTRAMUSCULAR; INTRAVENOUS at 10:27

## 2022-11-07 RX ADMIN — SUGAMMADEX 200 MG: 100 INJECTION, SOLUTION INTRAVENOUS at 11:26

## 2022-11-07 RX ADMIN — DEXAMETHASONE SODIUM PHOSPHATE 10 MG: 10 INJECTION INTRAMUSCULAR; INTRAVENOUS at 10:54

## 2022-11-07 RX ADMIN — DIPHENHYDRAMINE HYDROCHLORIDE 25 MG: 50 INJECTION, SOLUTION INTRAMUSCULAR; INTRAVENOUS at 10:54

## 2022-11-07 RX ADMIN — SODIUM CHLORIDE, POTASSIUM CHLORIDE, SODIUM LACTATE AND CALCIUM CHLORIDE: 600; 310; 30; 20 INJECTION, SOLUTION INTRAVENOUS at 09:44

## 2022-11-07 RX ADMIN — ONDANSETRON 4 MG: 2 INJECTION INTRAMUSCULAR; INTRAVENOUS at 10:54

## 2022-11-07 RX ADMIN — Medication 2000 MG: at 10:42

## 2022-11-07 RX ADMIN — MIDAZOLAM HYDROCHLORIDE 2 MG: 1 INJECTION, SOLUTION INTRAMUSCULAR; INTRAVENOUS at 10:23

## 2022-11-07 RX ADMIN — SODIUM CHLORIDE, POTASSIUM CHLORIDE, SODIUM LACTATE AND CALCIUM CHLORIDE: 600; 310; 30; 20 INJECTION, SOLUTION INTRAVENOUS at 10:25

## 2022-11-07 RX ADMIN — SODIUM CHLORIDE, POTASSIUM CHLORIDE, SODIUM LACTATE AND CALCIUM CHLORIDE: 600; 310; 30; 20 INJECTION, SOLUTION INTRAVENOUS at 10:57

## 2022-11-07 RX ADMIN — FENTANYL CITRATE 50 MCG: 50 INJECTION, SOLUTION INTRAMUSCULAR; INTRAVENOUS at 10:23

## 2022-11-07 RX ADMIN — ROCURONIUM BROMIDE 50 MG: 10 INJECTION, SOLUTION INTRAVENOUS at 10:31

## 2022-11-07 RX ADMIN — ROCURONIUM BROMIDE 30 MG: 10 INJECTION, SOLUTION INTRAVENOUS at 10:58

## 2022-11-07 ASSESSMENT — PAIN SCALES - GENERAL
PAINLEVEL_OUTOF10: 0
PAINLEVEL_OUTOF10: 1
PAINLEVEL_OUTOF10: 0

## 2022-11-07 ASSESSMENT — PAIN - FUNCTIONAL ASSESSMENT: PAIN_FUNCTIONAL_ASSESSMENT: NONE - DENIES PAIN

## 2022-11-07 ASSESSMENT — PAIN DESCRIPTION - LOCATION: LOCATION: FACE;NOSE

## 2022-11-07 ASSESSMENT — PAIN DESCRIPTION - PAIN TYPE
TYPE: SURGICAL PAIN
TYPE: SURGICAL PAIN

## 2022-11-07 NOTE — ANESTHESIA PRE PROCEDURE
Department of Anesthesiology  Preprocedure Note       Name:  Yariel Lunsford   Age:  20 y.o.  :  2000                                          MRN:  [id number]         Date:  2022      Surgeon: Yusuf Maguire):  Luz Zhang MD    Procedure: Procedure(s):  Bilateral Inferior Turbinate Reduction and Nasal Valve repair    Medications prior to admission:   Prior to Admission medications    Medication Sig Start Date End Date Taking? Authorizing Provider   montelukast (SINGULAIR) 10 MG tablet take 1 tablet by mouth every evening 10/11/22   Luz Zhang MD   levothyroxine (SYNTHROID) 175 MCG tablet take 1 tablet by mouth once daily 22   Historical Provider, MD   azelastine (ASTELIN) 0.1 % nasal spray 2 sprays by Nasal route 2 times daily Use in each nostril as directed 22   Luz Zhang MD   fluticasone (FLONASE SENSIMIST) 27.5 MCG/SPRAY nasal spray 2 sprays by Each Nostril route daily 22   Luz Zhang MD   loratadine (CLARITIN) 10 MG tablet Take 10 mg by mouth daily    Historical Provider, MD   mometasone (NASONEX) 50 MCG/ACT nasal spray 2 sprays by Nasal route daily  Patient not taking: No sig reported 3/28/22   Luz Zhang MD       Current medications:    No current facility-administered medications for this encounter. Allergies:  No Known Allergies    Problem List:  There is no problem list on file for this patient. Past Medical History:        Diagnosis Date    Fracture of nasal bone     Headache     Hearing loss     Tinnitus        Past Surgical History:  History reviewed. No pertinent surgical history. Social History:    Social History     Tobacco Use    Smoking status: Never    Smokeless tobacco: Never   Substance Use Topics    Alcohol use: Never                                Counseling given: Not Answered      Vital Signs (Current): There were no vitals filed for this visit.                                            BP Readings from Last 3 Encounters:   22 138/82   08/22/22 134/78   06/24/22 130/68       NPO Status: Time of last liquid consumption: 2330                        Time of last solid consumption: 1800                        Date of last liquid consumption: 11/06/22                        Date of last solid food consumption: 11/06/22    BMI:   Wt Readings from Last 3 Encounters:   09/16/22 251 lb 6.4 oz (114 kg)   08/22/22 253 lb 3.2 oz (114.9 kg)   06/24/22 248 lb 9.6 oz (112.8 kg)     There is no height or weight on file to calculate BMI.    CBC:   Lab Results   Component Value Date/Time    WBC 6.8 11/02/2022 10:24 AM    RBC 5.14 11/02/2022 10:24 AM    HGB 14.8 11/02/2022 10:24 AM    HCT 43.9 11/02/2022 10:24 AM    MCV 85.4 11/02/2022 10:24 AM    RDW 12.9 11/02/2022 10:24 AM     11/02/2022 10:24 AM       CMP:   Lab Results   Component Value Date/Time     11/02/2022 10:24 AM    K 4.4 11/02/2022 10:24 AM     11/02/2022 10:24 AM    CO2 28 11/02/2022 10:24 AM    BUN 11 11/02/2022 10:24 AM    CREATININE 0.75 11/02/2022 10:24 AM    LABGLOM >60 11/02/2022 10:24 AM    GLUCOSE 91 11/02/2022 10:24 AM    CALCIUM 9.7 11/02/2022 10:24 AM       POC Tests: No results for input(s): POCGLU, POCNA, POCK, POCCL, POCBUN, POCHEMO, POCHCT in the last 72 hours.     Coags: No results found for: PROTIME, INR, APTT    HCG (If Applicable): No results found for: PREGTESTUR, PREGSERUM, HCG, HCGQUANT     ABGs: No results found for: PHART, PO2ART, ECW6DUY, BZV4ADI, BEART, T4ZVKFIK     Type & Screen (If Applicable):  No results found for: LABABO, LABRH    Drug/Infectious Status (If Applicable):  No results found for: HIV, HEPCAB    COVID-19 Screening (If Applicable): No results found for: COVID19        Anesthesia Evaluation  Patient summary reviewed and Nursing notes reviewed no history of anesthetic complications:   Airway: Mallampati: II  TM distance: >3 FB   Neck ROM: full  Mouth opening: > = 3 FB   Dental: normal exam         Pulmonary:normal exam    (+) asthma: Cardiovascular:Negative CV ROS                      Neuro/Psych:   (+) headaches:,             GI/Hepatic/Renal: Neg GI/Hepatic/Renal ROS            Endo/Other: Negative Endo/Other ROS                    Abdominal:             Vascular: Other Findings:           Anesthesia Plan      general     ASA 2       Induction: intravenous. MIPS: Postoperative opioids intended and Prophylactic antiemetics administered. Anesthetic plan and risks discussed with patient. Use of blood products discussed with patient whom consented to blood products.    Plan discussed with surgical team.                    EVELIN Arce - ADRI   11/7/2022

## 2022-11-07 NOTE — OP NOTE
Operative Note      Patient: Eli Elena  YOB: 2000  MRN: [id number]    Date of Procedure: 11/7/2022    Pre-Op Diagnosis: Hypertrophy of nasal turbinates [J34.3]  Nasal valve collapse [J34.89]    Post-Op Diagnosis: Same       Procedure(s):  Bilateral Inferior Turbinate Reduction and Nasal Valve repair    Surgeon(s):  Bria Puente MD    Assistant:   * No surgical staff found *    Anesthesia: General    Estimated Blood Loss (mL): Minimal    Complications: None    Specimens:   * No specimens in log *    Implants:  Implant Name Type Inv. Item Serial No.  Lot No. LRB No. Used Action   IMPLANT NASAL LATERA 20MM - [id number]  IMPLANT NASAL LATERA 20MM  [id number]  1 Implanted         Drains: * No LDAs found *    Findings: inferior turbinate hypertrophy     Procedure: Patient correctly identified in pre op holding. Taken to OR and placed supine. Placed under general anesthesia. Bed rotated 90 degrees to the patient's right. Patient prepped and draped in usual sterile fashion. Time out performed. Afrin soaked pledgets placed into nasal cavities. Nostrils swabbed with betadine. Zero degree endoscope used to performed bilateral nasal endoscopy. Findings above confirmed. 15 blade used to make vertical mucosal incision on anterior head of right inferior turbinate. [place] used to elevate submucoperiosteal flaps posteriorly on the medial edge of turbinate. 2mm microdebrider used to perform submucosal resection of the turbinate. Same procedure performed on the left inferior turbinate. On the right side of the patient, a line was drawn starting slightly medial to the medial canthus to the upper one third of the alar rim. A line was marked at 6 mm cranial to the bone/cartilage junction along the line as the most distal placement of the implant. The polymer implant was loaded into a sterile delivery device which includes a 16-gauge cannula.   The nasal mucosa was stabbed with the trocar cannula immediately cranial to the alar rim and the cannula was advanced through the incision along the line drawn. Using the cannula, a dissection plane was created over the upper lateral cartilage. At the junction with the nasal aspect of the maxillary bone, the cannula was brought superficial to the bone. Using a 16-gauge cannula, a dissection plane was created above the periosteum to the superficial aspect of the maxillary bone. The cannula tip was advanced to the target marked 6 mm cranial to the bony cartilaginous junction using palpation. Once the destination was obtained and the dissection was complete, the skin of the nose was normalized. Once it was determined that the cannula tip was at a sufficient depth, the 20mm polymer implant was delivered and the cannula was withdrawn. The entry wound was inspected to determine if suture closure was necessary. There is no bleeding at the insertion site and closure of the wound was unnecessary. The same procedure was performed on the left side. Straight suction used to clean both cavities once more to confirm hemostasis. Tolerated procedure well. Extubated without complications. Taken to pacu in stable condition.      Electronically signed by Stella Ayers MD on 11/7/2022 at 11:37 AM

## 2022-11-07 NOTE — DISCHARGE INSTRUCTIONS
LATER:  Do not pinch or blow nose for 1 week. Avoid moving nose from side to side for 1 week. Avoid aggressive or unnecessary manipulation of your nose for 2 weeks. Avoid strenuous activity for 1 week. Sneeze with mouth open for 1 week. Do not place any objects inside the nose. No heavy lifting, bending, straining or getting heart rate up for 7 days. No nose blowing for 7 days. Bleeding, drainage and mucus from the nose is normal and expected. May change mustache dressing under nose as needed for saturation/bleeding. May ice the outside of the nose for bleeding (20 minutes on and off during the first 2-3 days while awake). Ideal to sleep with head above the heart (head elevated) during first week after surgery so either 2+ pillows or recliner. Ok to shower day 2 after surgery but keep nose out of direct water. Avoid hot water as this increases the risk of nose bleed. Begin [place] Med Sinus Rinse the day after surgery (may obtain a bottle kit from the ENT office staff if patient does not have one). Use the rinse kit once a day until seen in follow up. Continue allergy pills. Hold off on flonase and astelin until 1 week after surgery. May increase frequency of cordell med rinse if feeling congested. May refer to printed instructions from the ENT office as well.

## 2022-11-07 NOTE — ANESTHESIA POSTPROCEDURE EVALUATION
Department of Anesthesiology  Postprocedure Note    Patient: Ruth Vigil  MRN: [id number]  [place]: 2000  Date of evaluation: 11/7/2022      Procedure Summary     Date: 11/07/22 Room / Location: [address] [place] [address]    Anesthesia Start: 1025 Anesthesia Stop: 1867    Procedure: Bilateral Inferior Turbinate Reduction and Nasal Valve repair (Bilateral: Nose) Diagnosis:       Hypertrophy of nasal turbinates      Nasal valve collapse      (Hypertrophy of nasal turbinates [J34.3])      (Nasal valve collapse [J34.89])    Surgeons: Brenden Lincoln MD Responsible Provider: EVELIN Loomis CRNA    Anesthesia Type: general ASA Status: 2          Anesthesia Type: No value filed.     Dustin Phase I: Dustin Score: 9    Dustin Phase II:        Anesthesia Post Evaluation    Patient location during evaluation: PACU  Patient participation: complete - patient participated  Level of consciousness: awake and alert  Pain score: 3  Airway patency: patent  Nausea & Vomiting: no nausea  Complications: no  Cardiovascular status: blood pressure returned to baseline and hemodynamically stable  Respiratory status: acceptable  Hydration status: euvolemic  Multimodal analgesia pain management approach

## 2022-11-07 NOTE — H&P
ASSESSMENT/PLAN:  1. Nasal congestion    2. Hypertrophy of inferior nasal turbinate    3. Nasal valve collapse    4. Pre-op testing       1. Nasal congestion  2. Hypertrophy of inferior nasal turbinate  3. Nasal valve collapse  4. Pre-op testing  -     CBC with Auto Differential; Future  -     Basic Metabolic Panel; Future     Will focus on allergies:  Continue flonase sensimist   Continue Astelin  Continue Singulair every evening  Continue claritin every am  Start NeilMed sinus rinse once a day     Discussed ITR and nasal valve repair with Latera due to maximal medical management and refractory symptoms     Add betamethasone      Discussed that the goal of surgery is to make chronic sinus and nasal disease less symptomatic and make chronic issues more manageable with medications. Discussed risks, benefits, indications, and alternatives to latera implant/turbinate reduction including but not limited to epistaxis/bleeding, pain, infection, crusting, nasal drainage, thick mucus, anosmia, prolonged healing, damage to surrounding structures need for revision procedures, extrusion, deformity, synechiae, and need for continued medical management to maintain results as well as continued nasal obstruction. No follow-ups on file. SUBJECTIVE/OBJECTIVE:  Sinus Problem     Other     20yo man with nasal obstruction for about 6 years. History of nasal bone fracture 2 years ago. Has been on Flonase and Claritin for about 10 years. Has diagnosis of allergic rhinitis. Also has hearing loss that he attributes to his sinus issues x 1.5 years. Works as  and it seems to be affecting his work. He states nasal obstruction is worse of the left side of his nose. He states that he is had multiple nasal traumas through sports and probably broken it more than once. He states that the nasal congestion is constant throughout the day and night but seems to be worse at nighttime.   He is worked as an  for about 6 years and has a significant noise exposure. He gets about 1 sinus infection a year. No history of imaging or allergy testing. No recent audiogram.  He also reports constant bilateral tinnitus. He has otalgia particularly in the left ear with high noise levels greater than 100 DB. Followed up about 2 months later. he states that his mom has 4 cats. He notices worsening nasal congestion and sinus issues with norma environments. RAST 3/28/22: +cat, dust mites      CT sinus 4/4/22: No evidence of sinusitis     Audiogram 4/12/22:   hearing within normal limits EU  Word discrimination score 100% AU  Tympanometry type A AU     Followed up to review response he is has stopped the Nasonex because of the numbness feeling it created in the nose. He has continued Astelin. He takes Claritin in the morning and Singulair in the evening. He has not noticed much improvement in his nasal congestion. He followed up about 6 weeks later to review his nasal congestion. He notices significant improvement after the Medrol Dosepak. He felt better for about 2 weeks but the symptoms have returned to his baseline. He continues to endorse nasal congestion throughout the day and at nighttime. He has been using routinely Flonase, Astelin, Singulair, Claritin. He follows up again about 4 weeks later. Noticing some improvement but minimal.  States that for the first 2 weeks he did notice some improvement but now it has decreased and returned to his baseline. 2 weeks ago he did suffer an upper respiratory infection. Past Medical History        Past Medical History:   Diagnosis Date    Fracture of nasal bone      Headache      Hearing loss      Tinnitus           Past Surgical History   History reviewed. No pertinent surgical history.      Social History    None         Family History         Family History   Problem Relation Age of Onset    No Known Problems Mother      Hypertension Father      No Known Problems Brother      No Known Problems Maternal Aunt      Hypertension Maternal Uncle      Thyroid Disease Paternal Aunt      Cancer Paternal Uncle      No Known Problems Maternal Grandmother      Cancer Maternal Grandfather      Thyroid Disease Paternal Grandmother      Cancer Paternal Grandfather      Diabetes Paternal Grandfather                Current Outpatient Medications   Medication Instructions    azelastine (ASTELIN) 0.1 % nasal spray 2 sprays, Nasal, 2 TIMES DAILY, Use in each nostril as directed    fluticasone (FLONASE SENSIMIST) 27.5 MCG/SPRAY nasal spray 2 sprays, Each Nostril, DAILY    levothyroxine (SYNTHROID) 175 MCG tablet take 1 tablet by mouth once daily    levothyroxine (SYNTHROID) 163 mcg, DAILY    loratadine (CLARITIN) 10 mg, Oral, DAILY    mometasone (NASONEX) 50 MCG/ACT nasal spray 2 sprays, Nasal, DAILY    montelukast (SINGULAIR) 10 mg, Oral, EVERY EVENING      No Known Allergies     ENT ROS: positive for - nasal congestion     General: The patient is found to be alert and normally responsive male. Hearing: grossly normal   Voice: Clear   Skin: The skin has normal colour and turgor. Face: The facial contour is symmetric at rest and with movement. Ears: The pinnae have normal contours. AD: EAC clear, TM intact, no effusion/erythema/retraction   AS: EAC clear, TM intact, no effusion/erythema/retraction   Eye: The ocular movements are full and symmetric, the conjunctiva is unremarkable; sclera are anicteric, pupillary response is symmetric. No nystagmus is found. Nose:   The external nasal contour is normal  Nasal mucosa inflamed  The nares are patent without evidence of polyposis   Inferior turbinate hypertrophy, more on the left than the right  + Modified Sana maneuver bilaterally  Oral cavity:   The dentition is healthy. The oral mucosa is without lesions;  the tongue is symmetric with full mobility and is without fasciculation. The soft palate is symmetric.    The oropharynx is unremarkable. Neck: The neck has a normal contour; no masses are found on palpation     Previous:  Fiberoptic examination of the upper airway using scope was conducted after first applying topical phenylephrine (1%) and lidocaine (4%) to the bilateral nasal cavity by spray. The endoscope was inserted and advanced through the bilateral side of the nose examining the mucosa and nasal structures. Right:  Inferior turbinate significantly enlarged, middle meatus clear, no polyps or purulence, excess thickened mucus  Left:  Inferior turbinate mildly enlarged and inflamed, middle meatus clear, no polyps or purulence  Nasopharynx clear, ET patent, adenoid small. Septum slightly to the right anteriorly.

## 2022-11-11 NOTE — INTERVAL H&P NOTE
Update History & Physical    The patient's History and Physical of November 7, 2022 was reviewed with the patient and I examined the patient. There was no change. The surgical site was confirmed by the patient and me. Plan: The risks, benefits, expected outcome, and alternative to the recommended procedure have been discussed with the patient. Patient understands and wants to proceed with the procedure.      Electronically signed by Abdiel Abreu MD on 11/11/2022 at 9:19 AM

## 2022-11-15 ENCOUNTER — OFFICE VISIT (OUTPATIENT)
Dept: OTOLARYNGOLOGY | Age: 22
End: 2022-11-15
Payer: COMMERCIAL

## 2022-11-15 VITALS
RESPIRATION RATE: 14 BRPM | HEIGHT: 70 IN | DIASTOLIC BLOOD PRESSURE: 68 MMHG | SYSTOLIC BLOOD PRESSURE: 104 MMHG | HEART RATE: 88 BPM | BODY MASS INDEX: 35.22 KG/M2 | WEIGHT: 246 LBS | OXYGEN SATURATION: 96 %

## 2022-11-15 DIAGNOSIS — J34.3 HYPERTROPHY OF INFERIOR NASAL TURBINATE: Primary | ICD-10-CM

## 2022-11-15 DIAGNOSIS — J30.89 ALLERGIC RHINITIS DUE TO DUST MITE: ICD-10-CM

## 2022-11-15 DIAGNOSIS — J34.89 NASAL VALVE COLLAPSE: ICD-10-CM

## 2022-11-15 PROCEDURE — 1036F TOBACCO NON-USER: CPT | Performed by: OTOLARYNGOLOGY

## 2022-11-15 PROCEDURE — G8484 FLU IMMUNIZE NO ADMIN: HCPCS | Performed by: OTOLARYNGOLOGY

## 2022-11-15 PROCEDURE — 99213 OFFICE O/P EST LOW 20 MIN: CPT | Performed by: OTOLARYNGOLOGY

## 2022-11-15 PROCEDURE — G8417 CALC BMI ABV UP PARAM F/U: HCPCS | Performed by: OTOLARYNGOLOGY

## 2022-11-15 PROCEDURE — G8427 DOCREV CUR MEDS BY ELIG CLIN: HCPCS | Performed by: OTOLARYNGOLOGY

## 2022-11-15 ASSESSMENT — ENCOUNTER SYMPTOMS: SINUS COMPLAINT: 1

## 2022-11-15 NOTE — PROGRESS NOTES
11/15/2022 8:19 AM EDT  Eduardo Macario (:  2000) is a 20 y.o. male,New patient, here for evaluation of the following chief complaint(s): Other (2 wk post turbinectomy and valve repair)      ASSESSMENT/PLAN:  1. Hypertrophy of inferior nasal turbinate    2. Nasal valve collapse    3. Allergic rhinitis due to dust mite      1. Hypertrophy of inferior nasal turbinate  2. Nasal valve collapse  3. Allergic rhinitis due to dust mite    Will focus on allergies:  Restart flonase sensimist   Restart Astelin  Continue Singulair every evening  Continue claritin every am  Continue NeilMed sinus rinse once a day    Discussed referral to allergy specialist to discuss possible immunotherapy    Fu in 3 weeks     No follow-ups on file. SUBJECTIVE/OBJECTIVE:  Sinus Problem    Other    20yo man with nasal obstruction for about 6 years. History of nasal bone fracture 2 years ago. Has been on Flonase and Claritin for about 10 years. Has diagnosis of allergic rhinitis. Also has hearing loss that he attributes to his sinus issues x 1.5 years. Works as  and it seems to be affecting his work. He states nasal obstruction is worse of the left side of his nose. He states that he is had multiple nasal traumas through sports and probably broken it more than once. He states that the nasal congestion is constant throughout the day and night but seems to be worse at nighttime. He is worked as an  for about 6 years and has a significant noise exposure. He gets about 1 sinus infection a year. No history of imaging or allergy testing. No recent audiogram.  He also reports constant bilateral tinnitus. He has otalgia particularly in the left ear with high noise levels greater than 100 DB. Followed up about 2 months later. he states that his mom has 4 cats. He notices worsening nasal congestion and sinus issues with norma environments.     RAST 3/28/22: +cat, dust mites     CT sinus 22: No evidence of sinusitis    Audiogram 4/12/22:   hearing within normal limits EU  Word discrimination score 100% AU  Tympanometry type A AU    Followed up to review response he is has stopped the Nasonex because of the numbness feeling it created in the nose. He has continued Astelin. He takes Claritin in the morning and Singulair in the evening. He has not noticed much improvement in his nasal congestion. He followed up about 6 weeks later to review his nasal congestion. He notices significant improvement after the Medrol Dosepak. He felt better for about 2 weeks but the symptoms have returned to his baseline. He continues to endorse nasal congestion throughout the day and at nighttime. He has been using routinely Flonase, Astelin, Singulair, Claritin. He followed up again about 4 weeks later. Noticing some improvement but minimal.  States that for the first 2 weeks he did notice some improvement but now it has decreased and returned to his baseline. 2 weeks ago he did suffer an upper respiratory infection. Is status post bilateral inferior turbinate reduction and nasal valve repair with Latera implants on 11/7/2022. He has been holding his Astelin and Flonase over the first week but has been doing a sinus rinse. He has not been blowing his nose and notices some tenderness in the area of the implants. Is noticing some crusting in the nose which is restricting the airflow but overall does notice an improvement in the airflow. Doing well with sinus rinse. Wondering if he can blow his nose now.     Past Medical History:   Diagnosis Date    Fracture of nasal bone     Headache     Hearing loss     Tinnitus      Past Surgical History:   Procedure Laterality Date    NOSE SURGERY Bilateral 11/7/2022    Bilateral Inferior Turbinate Reduction and Nasal Valve repair performed by Dionicia Phoenix, MD at [address] [place] [address] History    None       Family History   Problem Relation Age of Onset    No Known Problems Mother     Hypertension Father     No Known Problems Brother     No Known Problems Maternal Aunt     Hypertension Maternal Uncle     Thyroid Disease Paternal Aunt     Cancer Paternal Uncle     No Known Problems Maternal Grandmother     Cancer Maternal Grandfather     Thyroid Disease Paternal Grandmother     Cancer Paternal Grandfather     Diabetes Paternal Grandfather      Current Outpatient Medications   Medication Instructions    azelastine (ASTELIN) 0.1 % nasal spray 2 sprays, Nasal, 2 TIMES DAILY, Use in each nostril as directed    fluticasone (FLONASE SENSIMIST) 27.5 MCG/SPRAY nasal spray 2 sprays, Each Nostril, DAILY    levothyroxine (SYNTHROID) 175 MCG tablet take 1 tablet by mouth once daily    loratadine (CLARITIN) 10 mg, Oral, DAILY    mometasone (NASONEX) 50 MCG/ACT nasal spray 2 sprays, Nasal, DAILY    montelukast (SINGULAIR) 10 mg, Oral, EVERY EVENING    ondansetron (ZOFRAN-ODT) 4 mg, Oral, 3 TIMES DAILY PRN     No Known Allergies    ENT ROS: positive for - nasal congestion    General: The patient is found to be alert and normally responsive male. Hearing: grossly normal   Voice: Clear   Skin: The skin has normal colour and turgor. Face: The facial contour is symmetric at rest and with movement. Ears: The pinnae have normal contours. AD: EAC clear, TM intact, no effusion/erythema/retraction   AS: EAC clear, TM intact, no effusion/erythema/retraction   Eye: The ocular movements are full and symmetric, the conjunctiva is unremarkable; sclera are anicteric, pupillary response is symmetric. No nystagmus is found. Nose:   The external nasal contour is normal, tenderness across the nasal dorsum but no swelling or erythema, well-healing internal implant sites  Nasal mucosa normal  The nares are patent without evidence of polyposis   Inferior turbinates reduced with some crusting at the anterior head  Oral cavity:   The dentition is healthy.   The oral mucosa is without lesions;  the tongue is

## 2022-12-19 ENCOUNTER — OFFICE VISIT (OUTPATIENT)
Dept: OTOLARYNGOLOGY | Age: 22
End: 2022-12-19
Payer: COMMERCIAL

## 2022-12-19 VITALS
BODY MASS INDEX: 35.22 KG/M2 | WEIGHT: 246 LBS | HEIGHT: 70 IN | DIASTOLIC BLOOD PRESSURE: 80 MMHG | RESPIRATION RATE: 14 BRPM | SYSTOLIC BLOOD PRESSURE: 138 MMHG | HEART RATE: 78 BPM

## 2022-12-19 DIAGNOSIS — J34.3 HYPERTROPHY OF INFERIOR NASAL TURBINATE: Primary | ICD-10-CM

## 2022-12-19 DIAGNOSIS — J34.89 NASAL VALVE COLLAPSE: ICD-10-CM

## 2022-12-19 DIAGNOSIS — R09.81 NASAL CONGESTION: ICD-10-CM

## 2022-12-19 PROCEDURE — G8484 FLU IMMUNIZE NO ADMIN: HCPCS | Performed by: OTOLARYNGOLOGY

## 2022-12-19 PROCEDURE — G8427 DOCREV CUR MEDS BY ELIG CLIN: HCPCS | Performed by: OTOLARYNGOLOGY

## 2022-12-19 PROCEDURE — 99213 OFFICE O/P EST LOW 20 MIN: CPT | Performed by: OTOLARYNGOLOGY

## 2022-12-19 PROCEDURE — 1036F TOBACCO NON-USER: CPT | Performed by: OTOLARYNGOLOGY

## 2022-12-19 PROCEDURE — G8417 CALC BMI ABV UP PARAM F/U: HCPCS | Performed by: OTOLARYNGOLOGY

## 2022-12-19 ASSESSMENT — ENCOUNTER SYMPTOMS: SINUS COMPLAINT: 1

## 2022-12-19 NOTE — PROGRESS NOTES
2022 8:19 AM EDT  Jarod Mckeon (:  2000) is a 20 y.o. male,New patient, here for evaluation of the following chief complaint(s):  No chief complaint on file. ASSESSMENT/PLAN:  1. Hypertrophy of inferior nasal turbinate    2. Nasal valve collapse    3. Nasal congestion      1. Hypertrophy of inferior nasal turbinate  2. Nasal valve collapse  3. Nasal congestion    Will focus on allergies:  continue flonase sensimist   continue Astelin  Continue Singulair every evening  Continue claritin every am  Continue NeilMed sinus rinse once a day    Discussed referral to allergy specialist to discuss possible immunotherapy    Discussed referral to ENT vs pcp, he wishes to follow up with pcp for now    No follow-ups on file. SUBJECTIVE/OBJECTIVE:  Sinus Problem    Other    20yo man with nasal obstruction for about 6 years. History of nasal bone fracture 2 years ago. Has been on Flonase and Claritin for about 10 years. Has diagnosis of allergic rhinitis. Also has hearing loss that he attributes to his sinus issues x 1.5 years. Works as  and it seems to be affecting his work. He states nasal obstruction is worse of the left side of his nose. He states that he is had multiple nasal traumas through sports and probably broken it more than once. He states that the nasal congestion is constant throughout the day and night but seems to be worse at nighttime. He is worked as an  for about 6 years and has a significant noise exposure. He gets about 1 sinus infection a year. No history of imaging or allergy testing. No recent audiogram.  He also reports constant bilateral tinnitus. He has otalgia particularly in the left ear with high noise levels greater than 100 DB. Followed up about 2 months later. he states that his mom has 4 cats. He notices worsening nasal congestion and sinus issues with norma environments.     RAST 3/28/22: +cat, dust mites     CT sinus 22: No evidence of sinusitis    Audiogram 4/12/22:   hearing within normal limits EU  Word discrimination score 100% AU  Tympanometry type A AU    Followed up to review response he is has stopped the Nasonex because of the numbness feeling it created in the nose. He has continued Astelin. He takes Claritin in the morning and Singulair in the evening. He has not noticed much improvement in his nasal congestion. He followed up about 6 weeks later to review his nasal congestion. He notices significant improvement after the Medrol Dosepak. He felt better for about 2 weeks but the symptoms have returned to his baseline. He continues to endorse nasal congestion throughout the day and at nighttime. He has been using routinely Flonase, Astelin, Singulair, Claritin. He followed up again about 4 weeks later. Noticing some improvement but minimal.  States that for the first 2 weeks he did notice some improvement but now it has decreased and returned to his baseline. 2 weeks ago he did suffer an upper respiratory infection. Is status post bilateral inferior turbinate reduction and nasal valve repair with Latera implants on 11/7/2022. He has been holding his Astelin and Flonase over the first week but has been doing a sinus rinse. He has not been blowing his nose and notices some tenderness in the area of the implants. Is noticing some crusting in the nose which is restricting the airflow but overall does notice an improvement in the airflow. Doing well with sinus rinse. Wondering if he can blow his nose now. Follows up 6 weeks post op. Breathing well through his nose. Is having some insomnia since surgery. Better w melatonin.      Past Medical History:   Diagnosis Date    Fracture of nasal bone     Headache     Hearing loss     Tinnitus      Past Surgical History:   Procedure Laterality Date    NOSE SURGERY Bilateral 11/7/2022    Bilateral Inferior Turbinate Reduction and Nasal Valve repair performed by Roma Garcia MD at [address] [place] [address] History    None       Family History   Problem Relation Age of Onset    No Known Problems Mother     Hypertension Father     No Known Problems Brother     No Known Problems Maternal Aunt     Hypertension Maternal Uncle     Thyroid Disease Paternal Aunt     Cancer Paternal Uncle     No Known Problems Maternal Grandmother     Cancer Maternal Grandfather     Thyroid Disease Paternal Grandmother     Cancer Paternal Grandfather     Diabetes Paternal Grandfather      Current Outpatient Medications   Medication Instructions    azelastine (ASTELIN) 0.1 % nasal spray 2 sprays, Nasal, 2 TIMES DAILY, Use in each nostril as directed    fluticasone (FLONASE SENSIMIST) 27.5 MCG/SPRAY nasal spray 2 sprays, Each Nostril, DAILY    levothyroxine (SYNTHROID) 175 MCG tablet take 1 tablet by mouth once daily    loratadine (CLARITIN) 10 mg, Oral, DAILY    mometasone (NASONEX) 50 MCG/ACT nasal spray 2 sprays, Nasal, DAILY    montelukast (SINGULAIR) 10 mg, Oral, EVERY EVENING    ondansetron (ZOFRAN-ODT) 4 mg, Oral, 3 TIMES DAILY PRN     No Known Allergies    ENT ROS: positive for - nasal congestion    General: The patient is found to be alert and normally responsive male. Hearing: grossly normal   Voice: Clear   Skin: The skin has normal colour and turgor. Face: The facial contour is symmetric at rest and with movement. Ears: The pinnae have normal contours. AD: EAC clear, TM intact, no effusion/erythema/retraction   AS: EAC clear, TM intact, no effusion/erythema/retraction   Eye: The ocular movements are full and symmetric, the conjunctiva is unremarkable; sclera are anicteric, pupillary response is symmetric. No nystagmus is found.     Nose:   The external nasal contour is normal, no swelling or erythema, well-healing internal implant sites  Nasal mucosa normal  The nares are patent without evidence of polyposis   Inferior turbinates reduced with some mild crusting at the anterior head on the left  Oral cavity:   The dentition is healthy. The oral mucosa is without lesions;  the tongue is symmetric with full mobility and is without fasciculation. The soft palate is symmetric. The oropharynx is unremarkable. Neck: The neck has a normal contour; no masses are found on palpation    Previous:  Fiberoptic examination of the upper airway using scope was conducted after first applying topical phenylephrine (1%) and lidocaine (4%) to the bilateral nasal cavity by spray. The endoscope was inserted and advanced through the bilateral side of the nose examining the mucosa and nasal structures. Right:  Inferior turbinate significantly enlarged, middle meatus clear, no polyps or purulence, excess thickened mucus  Left:  Inferior turbinate mildly enlarged and inflamed, middle meatus clear, no polyps or purulence  Nasopharynx clear, ET patent, adenoid small. Septum slightly to the right anteriorly. An electronic signature was used to authenticate this note.     --Melissa Mendoza MD     12/19/2022 8:19 AM EDT

## 2023-02-27 RX ORDER — MONTELUKAST SODIUM 10 MG/1
10 TABLET ORAL EVERY EVENING
Qty: 30 TABLET | Refills: 3 | OUTPATIENT
Start: 2023-02-27

## 2023-08-21 ENCOUNTER — OFFICE VISIT (OUTPATIENT)
Dept: PRIMARY CARE CLINIC | Age: 23
End: 2023-08-21
Payer: COMMERCIAL

## 2023-08-21 VITALS
SYSTOLIC BLOOD PRESSURE: 126 MMHG | TEMPERATURE: 98.3 F | HEART RATE: 86 BPM | WEIGHT: 245 LBS | OXYGEN SATURATION: 97 % | HEIGHT: 70 IN | BODY MASS INDEX: 35.07 KG/M2 | DIASTOLIC BLOOD PRESSURE: 82 MMHG

## 2023-08-21 DIAGNOSIS — A09 TRAVELER'S DIARRHEA: Primary | ICD-10-CM

## 2023-08-21 DIAGNOSIS — R05.9 COUGH IN ADULT: ICD-10-CM

## 2023-08-21 LAB
INFLUENZA A ANTIGEN, POC: NEGATIVE
INFLUENZA B ANTIGEN, POC: NEGATIVE
LOT EXPIRE DATE: NORMAL
LOT KIT NUMBER: NORMAL
SARS-COV-2, POC: NORMAL
VALID INTERNAL CONTROL: NORMAL
VENDOR AND KIT NAME POC: NORMAL

## 2023-08-21 PROCEDURE — 87428 SARSCOV & INF VIR A&B AG IA: CPT | Performed by: NURSE PRACTITIONER

## 2023-08-21 PROCEDURE — 99203 OFFICE O/P NEW LOW 30 MIN: CPT | Performed by: NURSE PRACTITIONER

## 2023-08-21 PROCEDURE — 1036F TOBACCO NON-USER: CPT | Performed by: NURSE PRACTITIONER

## 2023-08-21 PROCEDURE — G8427 DOCREV CUR MEDS BY ELIG CLIN: HCPCS | Performed by: NURSE PRACTITIONER

## 2023-08-21 PROCEDURE — G8417 CALC BMI ABV UP PARAM F/U: HCPCS | Performed by: NURSE PRACTITIONER

## 2023-08-21 RX ORDER — AZITHROMYCIN 250 MG/1
250 TABLET, FILM COATED ORAL SEE ADMIN INSTRUCTIONS
Qty: 6 TABLET | Refills: 0 | Status: SHIPPED | OUTPATIENT
Start: 2023-08-21 | End: 2023-08-26

## 2023-08-21 RX ORDER — ONDANSETRON 4 MG/1
4 TABLET, ORALLY DISINTEGRATING ORAL 3 TIMES DAILY PRN
Qty: 21 TABLET | Refills: 0 | Status: SHIPPED | OUTPATIENT
Start: 2023-08-21

## 2023-08-21 ASSESSMENT — ENCOUNTER SYMPTOMS
ABDOMINAL PAIN: 0
DIARRHEA: 1

## 2023-08-21 NOTE — PROGRESS NOTES
No rash. Neurological:      General: No focal deficit present. Mental Status: He is alert and oriented to person, place, and time. Cranial Nerves: No cranial nerve deficit. Gait: Gait normal.        Assessment:      1. Traveler's diarrhea    2. Cough in adult           Plan:    -rapid covid/ flu negative  -treating pt for travelers diarrhea based on recent travel from [facility] and new onset of diarrhea.   -push fluids/hydration  -tylenol/motrin prn. Orders Placed This Encounter   Procedures    POCT COVID-19 & Influenza A/B     Order Specific Question:   Employed in healthcare setting? Answer:   No     Order Specific Question:   Resident in a congregate (group) care setting? Answer:   No     Order Specific Question:   Pregnant:      Answer:   No      Outpatient Encounter Medications as of 8/21/2023   Medication Sig Dispense Refill    azithromycin (ZITHROMAX) 250 MG tablet Take 1 tablet by mouth See Admin Instructions for 5 days 500mg on day 1 followed by 250mg on days 2 - 5 6 tablet 0    ondansetron (ZOFRAN-ODT) 4 MG disintegrating tablet Take 1 tablet by mouth 3 times daily as needed for Nausea or Vomiting 21 tablet 0    montelukast (SINGULAIR) 10 MG tablet take 1 tablet by mouth every evening 30 tablet 3    levothyroxine (SYNTHROID) 175 MCG tablet take 1 tablet by mouth once daily      azelastine (ASTELIN) 0.1 % nasal spray 2 sprays by Nasal route 2 times daily Use in each nostril as directed 30 mL 5    fluticasone (FLONASE SENSIMIST) 27.5 MCG/SPRAY nasal spray 2 sprays by Each Nostril route daily 1 each 5    loratadine (CLARITIN) 10 MG tablet Take 1 tablet by mouth daily      mometasone (NASONEX) 50 MCG/ACT nasal spray 2 sprays by Nasal route daily 1 each 3    [DISCONTINUED] ondansetron (ZOFRAN-ODT) 4 MG disintegrating tablet Take 1 tablet by mouth 3 times daily as needed for Nausea or Vomiting (Patient not taking: Reported on 12/19/2022) 10 tablet 0     No facility-administered encounter

## 2023-09-11 ENCOUNTER — OFFICE VISIT (OUTPATIENT)
Dept: PRIMARY CARE CLINIC | Age: 23
End: 2023-09-11
Payer: COMMERCIAL

## 2023-09-11 VITALS
RESPIRATION RATE: 20 BRPM | DIASTOLIC BLOOD PRESSURE: 74 MMHG | WEIGHT: 253 LBS | HEART RATE: 104 BPM | TEMPERATURE: 99 F | SYSTOLIC BLOOD PRESSURE: 120 MMHG | BODY MASS INDEX: 36.22 KG/M2 | HEIGHT: 70 IN | OXYGEN SATURATION: 97 %

## 2023-09-11 DIAGNOSIS — S00.83XA CONTUSION OF FACE, INITIAL ENCOUNTER: Primary | ICD-10-CM

## 2023-09-11 DIAGNOSIS — S06.0X0A CONCUSSION WITHOUT LOSS OF CONSCIOUSNESS, INITIAL ENCOUNTER: ICD-10-CM

## 2023-09-11 PROCEDURE — G8427 DOCREV CUR MEDS BY ELIG CLIN: HCPCS | Performed by: FAMILY MEDICINE

## 2023-09-11 PROCEDURE — 99213 OFFICE O/P EST LOW 20 MIN: CPT | Performed by: FAMILY MEDICINE

## 2023-09-11 PROCEDURE — 1036F TOBACCO NON-USER: CPT | Performed by: FAMILY MEDICINE

## 2023-09-11 PROCEDURE — G8417 CALC BMI ABV UP PARAM F/U: HCPCS | Performed by: FAMILY MEDICINE

## 2023-09-11 SDOH — ECONOMIC STABILITY: FOOD INSECURITY: WITHIN THE PAST 12 MONTHS, THE FOOD YOU BOUGHT JUST DIDN'T LAST AND YOU DIDN'T HAVE MONEY TO GET MORE.: NEVER TRUE

## 2023-09-11 SDOH — ECONOMIC STABILITY: FOOD INSECURITY: WITHIN THE PAST 12 MONTHS, YOU WORRIED THAT YOUR FOOD WOULD RUN OUT BEFORE YOU GOT MONEY TO BUY MORE.: NEVER TRUE

## 2023-09-11 SDOH — ECONOMIC STABILITY: HOUSING INSECURITY
IN THE LAST 12 MONTHS, WAS THERE A TIME WHEN YOU DID NOT HAVE A STEADY PLACE TO SLEEP OR SLEPT IN A SHELTER (INCLUDING NOW)?: NO

## 2023-09-11 SDOH — ECONOMIC STABILITY: INCOME INSECURITY: HOW HARD IS IT FOR YOU TO PAY FOR THE VERY BASICS LIKE FOOD, HOUSING, MEDICAL CARE, AND HEATING?: NOT HARD AT ALL

## 2023-09-11 ASSESSMENT — PATIENT HEALTH QUESTIONNAIRE - PHQ9
SUM OF ALL RESPONSES TO PHQ QUESTIONS 1-9: 0
SUM OF ALL RESPONSES TO PHQ QUESTIONS 1-9: 0
SUM OF ALL RESPONSES TO PHQ9 QUESTIONS 1 & 2: 0
1. LITTLE INTEREST OR PLEASURE IN DOING THINGS: 0
SUM OF ALL RESPONSES TO PHQ QUESTIONS 1-9: 0
2. FEELING DOWN, DEPRESSED OR HOPELESS: 0
SUM OF ALL RESPONSES TO PHQ QUESTIONS 1-9: 0

## 2023-09-11 ASSESSMENT — ENCOUNTER SYMPTOMS
EYE PAIN: 0
PHOTOPHOBIA: 0
RESPIRATORY NEGATIVE: 1
SINUS PAIN: 0
EYES NEGATIVE: 1
GASTROINTESTINAL NEGATIVE: 1
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
## 2024-01-17 ENCOUNTER — OFFICE VISIT (OUTPATIENT)
Dept: PRIMARY CARE CLINIC | Age: 24
End: 2024-01-17
Payer: COMMERCIAL

## 2024-01-17 VITALS
BODY MASS INDEX: 37.51 KG/M2 | DIASTOLIC BLOOD PRESSURE: 94 MMHG | HEART RATE: 105 BPM | WEIGHT: 262 LBS | TEMPERATURE: 98.7 F | OXYGEN SATURATION: 95 % | SYSTOLIC BLOOD PRESSURE: 140 MMHG | HEIGHT: 70 IN

## 2024-01-17 DIAGNOSIS — J01.00 ACUTE MAXILLARY SINUSITIS, RECURRENCE NOT SPECIFIED: Primary | ICD-10-CM

## 2024-01-17 PROCEDURE — 99213 OFFICE O/P EST LOW 20 MIN: CPT

## 2024-01-17 PROCEDURE — 1036F TOBACCO NON-USER: CPT

## 2024-01-17 PROCEDURE — G8427 DOCREV CUR MEDS BY ELIG CLIN: HCPCS

## 2024-01-17 PROCEDURE — G8484 FLU IMMUNIZE NO ADMIN: HCPCS

## 2024-01-17 PROCEDURE — G8417 CALC BMI ABV UP PARAM F/U: HCPCS

## 2024-01-17 RX ORDER — DOXYCYCLINE HYCLATE 100 MG
100 TABLET ORAL 2 TIMES DAILY
Qty: 14 TABLET | Refills: 0 | Status: SHIPPED | OUTPATIENT
Start: 2024-01-17 | End: 2024-01-24

## 2024-01-17 ASSESSMENT — ENCOUNTER SYMPTOMS
RHINORRHEA: 1
NAUSEA: 0
VOMITING: 0
CONSTIPATION: 0
WHEEZING: 0
GASTROINTESTINAL NEGATIVE: 1
EYES NEGATIVE: 1
DIARRHEA: 0
COUGH: 1
ALLERGIC/IMMUNOLOGIC NEGATIVE: 1
SINUS PRESSURE: 1
SHORTNESS OF BREATH: 1
SORE THROAT: 1
SINUS PAIN: 0

## 2024-01-17 ASSESSMENT — PATIENT HEALTH QUESTIONNAIRE - PHQ9
SUM OF ALL RESPONSES TO PHQ QUESTIONS 1-9: 0
2. FEELING DOWN, DEPRESSED OR HOPELESS: 0
SUM OF ALL RESPONSES TO PHQ QUESTIONS 1-9: 0

## 2024-01-17 NOTE — PROGRESS NOTES
status: Never    Smokeless tobacco: Never   Substance Use Topics    Alcohol use: Never      Prior to Visit Medications    Medication Sig Taking? Authorizing Provider   levothyroxine (SYNTHROID) 175 MCG tablet take 1 tablet by mouth once daily Yes Preeti Hollis MD   azelastine (ASTELIN) 0.1 % nasal spray 2 sprays by Nasal route 2 times daily Use in each nostril as directed Yes Stanley Ng MD   fluticasone (FLONASE SENSIMIST) 27.5 MCG/SPRAY nasal spray 2 sprays by Each Nostril route daily Yes Stanley Ng MD   loratadine (CLARITIN) 10 MG tablet Take 1 tablet by mouth daily Yes ProviderPreeti MD   montelukast (SINGULAIR) 10 MG tablet take 1 tablet by mouth every evening  Patient taking differently: Take 1 tablet by mouth as needed  Stanley Ng MD     Allergies   Allergen Reactions    Penicillins      From childhood       Subjective:      Review of Systems   Constitutional:  Negative for activity change, appetite change, fatigue and fever.   HENT:  Positive for congestion, ear pain, postnasal drip, rhinorrhea, sinus pressure and sore throat. Negative for ear discharge, sinus pain and tinnitus.    Eyes: Negative.    Respiratory:  Positive for cough and shortness of breath (with activity). Negative for wheezing.    Cardiovascular: Negative.    Gastrointestinal: Negative.  Negative for constipation, diarrhea, nausea and vomiting.   Endocrine: Negative.    Genitourinary: Negative.    Musculoskeletal: Negative.  Negative for myalgias.   Skin: Negative.    Allergic/Immunologic: Negative.    Neurological: Negative.  Negative for headaches.   Hematological: Negative.    Psychiatric/Behavioral: Negative.         Objective:     Physical Exam  Vitals and nursing note reviewed.   Constitutional:       General: He is not in acute distress.     Appearance: Normal appearance.   HENT:      Right Ear: Hearing, tympanic membrane, ear canal and external ear normal.      Left Ear: Hearing, tympanic membrane, ear

## 2024-01-17 NOTE — PATIENT INSTRUCTIONS
Continue with mucinex, nasal sprays, and inhalers  Complete full course of antibiotics  May use a lisa pot or saline rinses as tolerated  May use tylenol for headaches  Increase water intake  If symptoms worsen follow up with PCP  Patient verbalized understanding and agrees with plan of care

## (undated) DEVICE — Device

## (undated) DEVICE — INTENDED FOR TISSUE SEPARATION, AND OTHER PROCEDURES THAT REQUIRE A SHARP SURGICAL BLADE TO PUNCTURE OR CUT.: Brand: BARD-PARKER ® CARBON RIB-BACK BLADES

## (undated) DEVICE — 4-PORT MANIFOLD: Brand: NEPTUNE 2

## (undated) DEVICE — PACK SET UP

## (undated) DEVICE — SOLUTION IV IRRIG WATER 1000ML POUR BRL 2F7114

## (undated) DEVICE — 9165 UNIVERSAL PATIENT PLATE: Brand: 3M™

## (undated) DEVICE — TUBING, SUCTION, 3/16" X 20', STRAIGHT: Brand: MEDLINE

## (undated) DEVICE — DRAPE,SPLIT ,77X120: Brand: MEDLINE

## (undated) DEVICE — 1810 FOAM BLOCK NEEDLE COUNTER: Brand: DEVON

## (undated) DEVICE — GAUZE,SPONGE,4"X4",16PLY,XRAY,STRL,LF: Brand: MEDLINE

## (undated) DEVICE — STANDARD HYPODERMIC NEEDLE,POLYPROPYLENE HUB: Brand: MONOJECT

## (undated) DEVICE — SPONGE,NEURO,0.5"X3",XR,STRL,LF,10/PK: Brand: MEDLINE

## (undated) DEVICE — GLOVE SURG SZ 6 THK91MIL LTX FREE SYN POLYISOPRENE ANTI

## (undated) DEVICE — SPLINT 1524055 DOYLE II AIRWAY SET: Brand: DOYLE II ™

## (undated) DEVICE — DRAPE,REIN 53X77,STERILE: Brand: MEDLINE

## (undated) DEVICE — TOWEL,OR,DSP,ST,BLUE,STD,4/PK,20PK/CS: Brand: MEDLINE

## (undated) DEVICE — COVER LT HNDL BLU PLAS

## (undated) DEVICE — SYRINGE MED 5ML STD CLR PLAS LUERLOCK TIP N CTRL DISP

## (undated) DEVICE — PAD,EYE,1-5/8X2 5/8,STERILE,LF,1/PK: Brand: MEDLINE

## (undated) DEVICE — MARKER W/PRE PRINTED CUSTOM LABEL

## (undated) DEVICE — PACK SURG PROC REMINDER N WVN DISPOSABLE BEAC TIME OUT